# Patient Record
Sex: FEMALE | Race: WHITE | NOT HISPANIC OR LATINO | Employment: FULL TIME | URBAN - METROPOLITAN AREA
[De-identification: names, ages, dates, MRNs, and addresses within clinical notes are randomized per-mention and may not be internally consistent; named-entity substitution may affect disease eponyms.]

---

## 2017-05-13 LAB
CHOLEST SERPL-MSCNC: 195 MG/DL (ref 0–200)
HDLC SERPL-MCNC: 59 MG/DL
LDLC SERPL CALC-MCNC: 117 MG/DL
TRIGL SERPL-MCNC: 94 MG/DL

## 2021-04-20 ENCOUNTER — OFFICE VISIT (OUTPATIENT)
Dept: FAMILY MEDICINE | Facility: CLINIC | Age: 51
End: 2021-04-20

## 2021-04-20 VITALS
SYSTOLIC BLOOD PRESSURE: 163 MMHG | WEIGHT: 230 LBS | HEART RATE: 76 BPM | OXYGEN SATURATION: 98 % | DIASTOLIC BLOOD PRESSURE: 107 MMHG | TEMPERATURE: 98 F | BODY MASS INDEX: 36.1 KG/M2 | RESPIRATION RATE: 16 BRPM | HEIGHT: 67 IN

## 2021-04-20 DIAGNOSIS — I10 ESSENTIAL HYPERTENSION: ICD-10-CM

## 2021-04-20 DIAGNOSIS — Z76.89 ENCOUNTER TO ESTABLISH CARE: Primary | ICD-10-CM

## 2021-04-20 DIAGNOSIS — F41.1 GAD (GENERALIZED ANXIETY DISORDER): ICD-10-CM

## 2021-04-20 PROCEDURE — 99204 PR OFFICE/OUTPT VISIT, NEW, LEVL IV, 45-59 MIN: ICD-10-PCS | Mod: S$GLB,,, | Performed by: NURSE PRACTITIONER

## 2021-04-20 PROCEDURE — 99204 OFFICE O/P NEW MOD 45 MIN: CPT | Mod: S$GLB,,, | Performed by: NURSE PRACTITIONER

## 2021-04-20 RX ORDER — ALPRAZOLAM 0.5 MG/1
TABLET ORAL
COMMUNITY
End: 2021-08-04 | Stop reason: SDUPTHER

## 2021-04-20 RX ORDER — LISINOPRIL AND HYDROCHLOROTHIAZIDE 20; 25 MG/1; MG/1
1 TABLET ORAL DAILY
Qty: 30 TABLET | Refills: 2 | Status: SHIPPED | OUTPATIENT
Start: 2021-04-20 | End: 2021-05-05

## 2021-04-20 RX ORDER — GABAPENTIN 600 MG/1
800 TABLET ORAL 2 TIMES DAILY
COMMUNITY
End: 2021-05-05 | Stop reason: SDUPTHER

## 2021-04-20 RX ORDER — NEBIVOLOL 5 MG/1
5 TABLET ORAL DAILY
COMMUNITY
End: 2021-04-20

## 2021-04-22 ENCOUNTER — NURSE TRIAGE (OUTPATIENT)
Dept: ADMINISTRATIVE | Facility: CLINIC | Age: 51
End: 2021-04-22

## 2021-04-22 ENCOUNTER — TELEPHONE (OUTPATIENT)
Dept: FAMILY MEDICINE | Facility: CLINIC | Age: 51
End: 2021-04-22

## 2021-04-22 ENCOUNTER — PATIENT OUTREACH (OUTPATIENT)
Dept: ADMINISTRATIVE | Facility: HOSPITAL | Age: 51
End: 2021-04-22

## 2021-04-22 ENCOUNTER — PATIENT MESSAGE (OUTPATIENT)
Dept: ADMINISTRATIVE | Facility: HOSPITAL | Age: 51
End: 2021-04-22

## 2021-04-22 DIAGNOSIS — I10 ESSENTIAL HYPERTENSION: ICD-10-CM

## 2021-04-22 DIAGNOSIS — Z11.59 NEED FOR HEPATITIS C SCREENING TEST: ICD-10-CM

## 2021-04-26 ENCOUNTER — TELEPHONE (OUTPATIENT)
Dept: FAMILY MEDICINE | Facility: CLINIC | Age: 51
End: 2021-04-26

## 2021-04-28 ENCOUNTER — TELEPHONE (OUTPATIENT)
Dept: FAMILY MEDICINE | Facility: CLINIC | Age: 51
End: 2021-04-28

## 2021-04-28 ENCOUNTER — PATIENT MESSAGE (OUTPATIENT)
Dept: FAMILY MEDICINE | Facility: CLINIC | Age: 51
End: 2021-04-28

## 2021-04-30 ENCOUNTER — TELEPHONE (OUTPATIENT)
Dept: ADMINISTRATIVE | Facility: HOSPITAL | Age: 51
End: 2021-04-30

## 2021-05-05 ENCOUNTER — OFFICE VISIT (OUTPATIENT)
Dept: FAMILY MEDICINE | Facility: CLINIC | Age: 51
End: 2021-05-05

## 2021-05-05 ENCOUNTER — LAB VISIT (OUTPATIENT)
Dept: LAB | Facility: HOSPITAL | Age: 51
End: 2021-05-05
Attending: NURSE PRACTITIONER

## 2021-05-05 VITALS
OXYGEN SATURATION: 98 % | RESPIRATION RATE: 17 BRPM | TEMPERATURE: 98 F | HEIGHT: 67 IN | WEIGHT: 230 LBS | DIASTOLIC BLOOD PRESSURE: 102 MMHG | BODY MASS INDEX: 36.1 KG/M2 | SYSTOLIC BLOOD PRESSURE: 154 MMHG | HEART RATE: 86 BPM

## 2021-05-05 DIAGNOSIS — I10 ESSENTIAL HYPERTENSION: ICD-10-CM

## 2021-05-05 DIAGNOSIS — Z13.220 ENCOUNTER FOR LIPID SCREENING FOR CARDIOVASCULAR DISEASE: ICD-10-CM

## 2021-05-05 DIAGNOSIS — Z12.31 BREAST CANCER SCREENING BY MAMMOGRAM: ICD-10-CM

## 2021-05-05 DIAGNOSIS — Z79.899 HIGH RISK MEDICATION USE: ICD-10-CM

## 2021-05-05 DIAGNOSIS — Z13.6 ENCOUNTER FOR LIPID SCREENING FOR CARDIOVASCULAR DISEASE: ICD-10-CM

## 2021-05-05 DIAGNOSIS — Z11.4 ENCOUNTER FOR SCREENING FOR HUMAN IMMUNODEFICIENCY VIRUS (HIV): ICD-10-CM

## 2021-05-05 DIAGNOSIS — I10 ESSENTIAL HYPERTENSION: Primary | ICD-10-CM

## 2021-05-05 DIAGNOSIS — Z11.59 NEED FOR HEPATITIS C SCREENING TEST: ICD-10-CM

## 2021-05-05 LAB
ALBUMIN SERPL BCP-MCNC: 4 G/DL (ref 3.5–5.2)
ALBUMIN SERPL BCP-MCNC: 4 G/DL (ref 3.5–5.2)
ALP SERPL-CCNC: 69 U/L (ref 55–135)
ALP SERPL-CCNC: 69 U/L (ref 55–135)
ALT SERPL W/O P-5'-P-CCNC: 28 U/L (ref 10–44)
ALT SERPL W/O P-5'-P-CCNC: 28 U/L (ref 10–44)
ANION GAP SERPL CALC-SCNC: 10 MMOL/L (ref 8–16)
ANION GAP SERPL CALC-SCNC: 10 MMOL/L (ref 8–16)
AST SERPL-CCNC: 19 U/L (ref 10–40)
AST SERPL-CCNC: 19 U/L (ref 10–40)
BASOPHILS # BLD AUTO: 0.07 K/UL (ref 0–0.2)
BASOPHILS NFR BLD: 1.3 % (ref 0–1.9)
BILIRUB SERPL-MCNC: 0.7 MG/DL (ref 0.1–1)
BILIRUB SERPL-MCNC: 0.7 MG/DL (ref 0.1–1)
BUN SERPL-MCNC: 17 MG/DL (ref 6–20)
BUN SERPL-MCNC: 17 MG/DL (ref 6–20)
CALCIUM SERPL-MCNC: 8.9 MG/DL (ref 8.7–10.5)
CALCIUM SERPL-MCNC: 8.9 MG/DL (ref 8.7–10.5)
CHLORIDE SERPL-SCNC: 103 MMOL/L (ref 95–110)
CHLORIDE SERPL-SCNC: 103 MMOL/L (ref 95–110)
CHOLEST SERPL-MCNC: 188 MG/DL (ref 120–199)
CHOLEST/HDLC SERPL: 3.7 {RATIO} (ref 2–5)
CO2 SERPL-SCNC: 27 MMOL/L (ref 23–29)
CO2 SERPL-SCNC: 27 MMOL/L (ref 23–29)
CREAT SERPL-MCNC: 1.2 MG/DL (ref 0.5–1.4)
CREAT SERPL-MCNC: 1.2 MG/DL (ref 0.5–1.4)
DIFFERENTIAL METHOD: NORMAL
EOSINOPHIL # BLD AUTO: 0.2 K/UL (ref 0–0.5)
EOSINOPHIL NFR BLD: 3.4 % (ref 0–8)
ERYTHROCYTE [DISTWIDTH] IN BLOOD BY AUTOMATED COUNT: 13 % (ref 11.5–14.5)
EST. GFR  (AFRICAN AMERICAN): >60 ML/MIN/1.73 M^2
EST. GFR  (AFRICAN AMERICAN): >60 ML/MIN/1.73 M^2
EST. GFR  (NON AFRICAN AMERICAN): 52.1 ML/MIN/1.73 M^2
EST. GFR  (NON AFRICAN AMERICAN): 52.1 ML/MIN/1.73 M^2
GLUCOSE SERPL-MCNC: 112 MG/DL (ref 70–110)
GLUCOSE SERPL-MCNC: 112 MG/DL (ref 70–110)
HCT VFR BLD AUTO: 42.6 % (ref 37–48.5)
HDLC SERPL-MCNC: 51 MG/DL (ref 40–75)
HDLC SERPL: 27.1 % (ref 20–50)
HGB BLD-MCNC: 13.9 G/DL (ref 12–16)
IMM GRANULOCYTES # BLD AUTO: 0.01 K/UL (ref 0–0.04)
IMM GRANULOCYTES NFR BLD AUTO: 0.2 % (ref 0–0.5)
LDLC SERPL CALC-MCNC: 119.2 MG/DL (ref 63–159)
LYMPHOCYTES # BLD AUTO: 2.1 K/UL (ref 1–4.8)
LYMPHOCYTES NFR BLD: 39.2 % (ref 18–48)
MCH RBC QN AUTO: 30 PG (ref 27–31)
MCHC RBC AUTO-ENTMCNC: 32.6 G/DL (ref 32–36)
MCV RBC AUTO: 92 FL (ref 82–98)
MONOCYTES # BLD AUTO: 0.5 K/UL (ref 0.3–1)
MONOCYTES NFR BLD: 8.6 % (ref 4–15)
NEUTROPHILS # BLD AUTO: 2.5 K/UL (ref 1.8–7.7)
NEUTROPHILS NFR BLD: 47.3 % (ref 38–73)
NONHDLC SERPL-MCNC: 137 MG/DL
NRBC BLD-RTO: 0 /100 WBC
PLATELET # BLD AUTO: 222 K/UL (ref 150–450)
PMV BLD AUTO: 11.8 FL (ref 9.2–12.9)
POTASSIUM SERPL-SCNC: 4.1 MMOL/L (ref 3.5–5.1)
POTASSIUM SERPL-SCNC: 4.1 MMOL/L (ref 3.5–5.1)
PROT SERPL-MCNC: 7.3 G/DL (ref 6–8.4)
PROT SERPL-MCNC: 7.3 G/DL (ref 6–8.4)
RBC # BLD AUTO: 4.64 M/UL (ref 4–5.4)
SODIUM SERPL-SCNC: 140 MMOL/L (ref 136–145)
SODIUM SERPL-SCNC: 140 MMOL/L (ref 136–145)
T4 FREE SERPL-MCNC: 1.15 NG/DL (ref 0.71–1.51)
TRIGL SERPL-MCNC: 89 MG/DL (ref 30–150)
TSH SERPL DL<=0.005 MIU/L-ACNC: 2.39 UIU/ML (ref 0.34–5.6)
WBC # BLD AUTO: 5.26 K/UL (ref 3.9–12.7)

## 2021-05-05 PROCEDURE — 99214 PR OFFICE/OUTPT VISIT, EST, LEVL IV, 30-39 MIN: ICD-10-PCS | Mod: S$GLB,,, | Performed by: NURSE PRACTITIONER

## 2021-05-05 PROCEDURE — 80061 LIPID PANEL: CPT | Performed by: NURSE PRACTITIONER

## 2021-05-05 PROCEDURE — 86803 HEPATITIS C AB TEST: CPT | Performed by: NURSE PRACTITIONER

## 2021-05-05 PROCEDURE — 84443 ASSAY THYROID STIM HORMONE: CPT | Performed by: NURSE PRACTITIONER

## 2021-05-05 PROCEDURE — 85025 COMPLETE CBC W/AUTO DIFF WBC: CPT | Performed by: NURSE PRACTITIONER

## 2021-05-05 PROCEDURE — 99214 OFFICE O/P EST MOD 30 MIN: CPT | Mod: S$GLB,,, | Performed by: NURSE PRACTITIONER

## 2021-05-05 PROCEDURE — 86703 HIV-1/HIV-2 1 RESULT ANTBDY: CPT | Performed by: NURSE PRACTITIONER

## 2021-05-05 PROCEDURE — 84439 ASSAY OF FREE THYROXINE: CPT | Performed by: NURSE PRACTITIONER

## 2021-05-05 PROCEDURE — 80053 COMPREHEN METABOLIC PANEL: CPT | Performed by: NURSE PRACTITIONER

## 2021-05-05 PROCEDURE — 36415 COLL VENOUS BLD VENIPUNCTURE: CPT | Performed by: NURSE PRACTITIONER

## 2021-05-05 RX ORDER — LOSARTAN POTASSIUM 100 MG/1
100 TABLET ORAL DAILY
Qty: 30 TABLET | Refills: 5 | Status: SHIPPED | OUTPATIENT
Start: 2021-05-05 | End: 2021-11-22

## 2021-05-05 RX ORDER — GABAPENTIN 800 MG/1
800 TABLET ORAL 3 TIMES DAILY
COMMUNITY
Start: 2021-04-12 | End: 2021-08-15 | Stop reason: SDUPTHER

## 2021-05-05 RX ORDER — HYDROCHLOROTHIAZIDE 25 MG/1
25 TABLET ORAL DAILY
Qty: 30 TABLET | Refills: 5 | Status: SHIPPED | OUTPATIENT
Start: 2021-05-05 | End: 2021-11-05

## 2021-05-06 LAB
HCV AB SERPL QL IA: NEGATIVE
HIV 1+2 AB+HIV1 P24 AG SERPL QL IA: NEGATIVE

## 2021-05-26 ENCOUNTER — TELEPHONE (OUTPATIENT)
Dept: FAMILY MEDICINE | Facility: CLINIC | Age: 51
End: 2021-05-26

## 2021-06-03 ENCOUNTER — OFFICE VISIT (OUTPATIENT)
Dept: FAMILY MEDICINE | Facility: CLINIC | Age: 51
End: 2021-06-03

## 2021-06-03 VITALS
BODY MASS INDEX: 36.1 KG/M2 | WEIGHT: 230 LBS | SYSTOLIC BLOOD PRESSURE: 139 MMHG | HEART RATE: 62 BPM | TEMPERATURE: 97 F | OXYGEN SATURATION: 98 % | HEIGHT: 67 IN | RESPIRATION RATE: 16 BRPM | DIASTOLIC BLOOD PRESSURE: 87 MMHG

## 2021-06-03 DIAGNOSIS — I10 ESSENTIAL HYPERTENSION: ICD-10-CM

## 2021-06-03 DIAGNOSIS — E66.01 CLASS 2 SEVERE OBESITY DUE TO EXCESS CALORIES WITH SERIOUS COMORBIDITY AND BODY MASS INDEX (BMI) OF 36.0 TO 36.9 IN ADULT: Primary | ICD-10-CM

## 2021-06-03 DIAGNOSIS — R21 RASH AND NONSPECIFIC SKIN ERUPTION: ICD-10-CM

## 2021-06-03 PROCEDURE — 99214 PR OFFICE/OUTPT VISIT, EST, LEVL IV, 30-39 MIN: ICD-10-PCS | Mod: S$GLB,,, | Performed by: NURSE PRACTITIONER

## 2021-06-03 PROCEDURE — 99214 OFFICE O/P EST MOD 30 MIN: CPT | Mod: S$GLB,,, | Performed by: NURSE PRACTITIONER

## 2021-06-03 RX ORDER — BETAMETHASONE DIPROPIONATE 0.5 MG/G
CREAM TOPICAL 2 TIMES DAILY
Qty: 15 G | Refills: 1 | Status: SHIPPED | OUTPATIENT
Start: 2021-06-03 | End: 2021-08-04

## 2021-06-03 RX ORDER — AMLODIPINE BESYLATE 5 MG/1
5 TABLET ORAL DAILY
Qty: 30 TABLET | Refills: 6 | Status: SHIPPED | OUTPATIENT
Start: 2021-06-03 | End: 2021-08-04

## 2021-06-03 RX ORDER — PHENTERMINE HYDROCHLORIDE 37.5 MG/1
37.5 CAPSULE ORAL EVERY MORNING
Qty: 30 CAPSULE | Refills: 0 | Status: SHIPPED | OUTPATIENT
Start: 2021-06-03 | End: 2021-07-03

## 2021-07-21 ENCOUNTER — PATIENT OUTREACH (OUTPATIENT)
Dept: ADMINISTRATIVE | Facility: HOSPITAL | Age: 51
End: 2021-07-21

## 2021-07-21 ENCOUNTER — PATIENT MESSAGE (OUTPATIENT)
Dept: ADMINISTRATIVE | Facility: HOSPITAL | Age: 51
End: 2021-07-21

## 2021-08-04 ENCOUNTER — OFFICE VISIT (OUTPATIENT)
Dept: FAMILY MEDICINE | Facility: CLINIC | Age: 51
End: 2021-08-04

## 2021-08-04 VITALS
SYSTOLIC BLOOD PRESSURE: 122 MMHG | WEIGHT: 221 LBS | RESPIRATION RATE: 17 BRPM | OXYGEN SATURATION: 97 % | HEIGHT: 67 IN | HEART RATE: 62 BPM | DIASTOLIC BLOOD PRESSURE: 88 MMHG | BODY MASS INDEX: 34.69 KG/M2 | TEMPERATURE: 98 F

## 2021-08-04 DIAGNOSIS — E66.09 CLASS 1 OBESITY DUE TO EXCESS CALORIES WITH SERIOUS COMORBIDITY AND BODY MASS INDEX (BMI) OF 34.0 TO 34.9 IN ADULT: ICD-10-CM

## 2021-08-04 DIAGNOSIS — F41.1 GAD (GENERALIZED ANXIETY DISORDER): ICD-10-CM

## 2021-08-04 DIAGNOSIS — I10 ESSENTIAL HYPERTENSION: Primary | ICD-10-CM

## 2021-08-04 PROCEDURE — 99213 PR OFFICE/OUTPT VISIT, EST, LEVL III, 20-29 MIN: ICD-10-PCS | Mod: S$GLB,,, | Performed by: NURSE PRACTITIONER

## 2021-08-04 PROCEDURE — 99213 OFFICE O/P EST LOW 20 MIN: CPT | Mod: S$GLB,,, | Performed by: NURSE PRACTITIONER

## 2021-08-04 RX ORDER — ALPRAZOLAM 0.5 MG/1
0.5 TABLET ORAL 2 TIMES DAILY PRN
Qty: 30 TABLET | Refills: 1 | Status: SHIPPED | OUTPATIENT
Start: 2021-08-04 | End: 2022-09-30 | Stop reason: SDUPTHER

## 2021-08-04 RX ORDER — PHENTERMINE HYDROCHLORIDE 37.5 MG/1
37.5 TABLET ORAL
Qty: 30 TABLET | Refills: 0 | Status: SHIPPED | OUTPATIENT
Start: 2021-08-04 | End: 2021-09-03

## 2021-08-13 ENCOUNTER — PATIENT MESSAGE (OUTPATIENT)
Dept: FAMILY MEDICINE | Facility: CLINIC | Age: 51
End: 2021-08-13

## 2021-08-13 DIAGNOSIS — G89.4 CHRONIC PAIN SYNDROME: Primary | ICD-10-CM

## 2021-08-15 RX ORDER — GABAPENTIN 800 MG/1
800 TABLET ORAL 3 TIMES DAILY
Qty: 270 TABLET | Refills: 1 | Status: SHIPPED | OUTPATIENT
Start: 2021-08-15 | End: 2022-09-30 | Stop reason: SDUPTHER

## 2022-01-02 ENCOUNTER — PATIENT MESSAGE (OUTPATIENT)
Dept: ADMINISTRATIVE | Facility: HOSPITAL | Age: 52
End: 2022-01-02

## 2022-01-05 DIAGNOSIS — Z12.11 COLON CANCER SCREENING: ICD-10-CM

## 2022-01-10 ENCOUNTER — PATIENT MESSAGE (OUTPATIENT)
Dept: ADMINISTRATIVE | Facility: HOSPITAL | Age: 52
End: 2022-01-10

## 2022-04-04 ENCOUNTER — PATIENT MESSAGE (OUTPATIENT)
Dept: ADMINISTRATIVE | Facility: HOSPITAL | Age: 52
End: 2022-04-04

## 2022-05-11 DIAGNOSIS — I10 ESSENTIAL HYPERTENSION: ICD-10-CM

## 2022-05-19 ENCOUNTER — PATIENT MESSAGE (OUTPATIENT)
Dept: FAMILY MEDICINE | Facility: CLINIC | Age: 52
End: 2022-05-19

## 2022-05-19 DIAGNOSIS — R73.01 ELEVATED FASTING GLUCOSE: Primary | ICD-10-CM

## 2022-05-19 DIAGNOSIS — I10 ESSENTIAL HYPERTENSION: ICD-10-CM

## 2022-05-21 RX ORDER — LOSARTAN POTASSIUM 100 MG/1
100 TABLET ORAL DAILY
Qty: 30 TABLET | Refills: 1 | Status: SHIPPED | OUTPATIENT
Start: 2022-05-21 | End: 2022-07-31

## 2022-05-21 RX ORDER — HYDROCHLOROTHIAZIDE 25 MG/1
25 TABLET ORAL DAILY
Qty: 30 TABLET | Refills: 1 | Status: SHIPPED | OUTPATIENT
Start: 2022-05-21 | End: 2022-07-31

## 2022-05-29 ENCOUNTER — TELEPHONE (OUTPATIENT)
Dept: FAMILY MEDICINE | Facility: CLINIC | Age: 52
End: 2022-05-29

## 2022-05-31 ENCOUNTER — PATIENT MESSAGE (OUTPATIENT)
Dept: ADMINISTRATIVE | Facility: HOSPITAL | Age: 52
End: 2022-05-31

## 2022-07-06 DIAGNOSIS — Z12.31 OTHER SCREENING MAMMOGRAM: ICD-10-CM

## 2022-07-11 ENCOUNTER — PATIENT MESSAGE (OUTPATIENT)
Dept: ADMINISTRATIVE | Facility: HOSPITAL | Age: 52
End: 2022-07-11

## 2022-08-24 ENCOUNTER — PATIENT MESSAGE (OUTPATIENT)
Dept: ADMINISTRATIVE | Facility: HOSPITAL | Age: 52
End: 2022-08-24

## 2022-09-15 ENCOUNTER — PATIENT MESSAGE (OUTPATIENT)
Dept: ADMINISTRATIVE | Facility: HOSPITAL | Age: 52
End: 2022-09-15

## 2022-09-28 DIAGNOSIS — I10 ESSENTIAL HYPERTENSION: ICD-10-CM

## 2022-09-28 DIAGNOSIS — G89.4 CHRONIC PAIN SYNDROME: ICD-10-CM

## 2022-09-29 NOTE — TELEPHONE ENCOUNTER
----- Message from Jennifer Quigley sent at 9/29/2022  4:11 PM CDT -----  Regarding: refill  Can the clinic reply in [facility]:       Please refill the medication(s) listed below. Please call the patient when the prescription(s) is ready for  at this phone number NEWTON GRIFFITH [[phone]] [phone] (home)           Medication #1 losartan (COZAAR) 100 MG tablet    Medication #2 hydroCHLOROthiazide (HYDRODIURIL) 25 MG tablet    Medication #3 gabapentin (NEURONTIN) 800 MG tablet      Preferred Pharmacy:   [id number] [place] [id number] AT [facility] OF [address] [facility]  [address] [address]  Phone: [phone] Fax: [phone]

## 2022-09-30 ENCOUNTER — OFFICE VISIT (OUTPATIENT)
Dept: FAMILY MEDICINE | Facility: CLINIC | Age: 52
End: 2022-09-30

## 2022-09-30 VITALS
BODY MASS INDEX: 35.37 KG/M2 | HEART RATE: 69 BPM | RESPIRATION RATE: 16 BRPM | OXYGEN SATURATION: 97 % | WEIGHT: 225.38 LBS | HEIGHT: 67 IN | DIASTOLIC BLOOD PRESSURE: 78 MMHG | SYSTOLIC BLOOD PRESSURE: 132 MMHG | TEMPERATURE: 98 F

## 2022-09-30 DIAGNOSIS — G89.4 CHRONIC PAIN SYNDROME: ICD-10-CM

## 2022-09-30 DIAGNOSIS — F41.1 GAD (GENERALIZED ANXIETY DISORDER): ICD-10-CM

## 2022-09-30 DIAGNOSIS — E66.01 CLASS 2 SEVERE OBESITY DUE TO EXCESS CALORIES WITH SERIOUS COMORBIDITY AND BODY MASS INDEX (BMI) OF 35.0 TO 35.9 IN ADULT: ICD-10-CM

## 2022-09-30 DIAGNOSIS — I10 ESSENTIAL HYPERTENSION: Primary | ICD-10-CM

## 2022-09-30 PROCEDURE — 99999 PR PBB SHADOW E&M-EST. PATIENT-LVL IV: CPT | Mod: PBBFAC,,, | Performed by: NURSE PRACTITIONER

## 2022-09-30 PROCEDURE — 99214 OFFICE O/P EST MOD 30 MIN: CPT | Mod: S$PBB,,, | Performed by: NURSE PRACTITIONER

## 2022-09-30 PROCEDURE — 99214 OFFICE O/P EST MOD 30 MIN: CPT | Mod: PBBFAC | Performed by: NURSE PRACTITIONER

## 2022-09-30 PROCEDURE — 99214 PR OFFICE/OUTPT VISIT, EST, LEVL IV, 30-39 MIN: ICD-10-PCS | Mod: S$PBB,,, | Performed by: NURSE PRACTITIONER

## 2022-09-30 PROCEDURE — 99999 PR PBB SHADOW E&M-EST. PATIENT-LVL IV: ICD-10-PCS | Mod: PBBFAC,,, | Performed by: NURSE PRACTITIONER

## 2022-09-30 RX ORDER — PAROXETINE 10 MG/1
10 TABLET, FILM COATED ORAL EVERY MORNING
Qty: 90 TABLET | Refills: 3 | Status: SHIPPED | OUTPATIENT
Start: 2022-09-30 | End: 2023-01-03

## 2022-09-30 RX ORDER — HYDROCHLOROTHIAZIDE 25 MG/1
25 TABLET ORAL DAILY
Qty: 90 TABLET | Refills: 1 | Status: SHIPPED | OUTPATIENT
Start: 2022-09-30 | End: 2023-04-24 | Stop reason: SDUPTHER

## 2022-09-30 RX ORDER — ALPRAZOLAM 0.5 MG/1
0.5 TABLET ORAL DAILY PRN
Qty: 30 TABLET | Refills: 2 | Status: SHIPPED | OUTPATIENT
Start: 2022-09-30 | End: 2023-01-03

## 2022-09-30 RX ORDER — LOSARTAN POTASSIUM 100 MG/1
100 TABLET ORAL DAILY
Qty: 90 TABLET | Refills: 1 | Status: SHIPPED | OUTPATIENT
Start: 2022-09-30 | End: 2023-04-24 | Stop reason: SDUPTHER

## 2022-09-30 RX ORDER — AMLODIPINE BESYLATE 5 MG/1
5 TABLET ORAL DAILY
Qty: 90 TABLET | Refills: 1 | Status: SHIPPED | OUTPATIENT
Start: 2022-09-30 | End: 2023-01-09

## 2022-09-30 RX ORDER — GABAPENTIN 800 MG/1
800 TABLET ORAL 3 TIMES DAILY
Qty: 270 TABLET | Refills: 3 | Status: SHIPPED | OUTPATIENT
Start: 2022-09-30 | End: 2023-08-28

## 2022-09-30 RX ORDER — AMLODIPINE BESYLATE 5 MG/1
5 TABLET ORAL DAILY
COMMUNITY
End: 2022-09-30 | Stop reason: SDUPTHER

## 2022-09-30 NOTE — PATIENT INSTRUCTIONS
[facility] [phone]  --------------  Needs colon cancer screening (FOBT)  Labs---CBC, CMP  Mammogram  Pap smear (53993)  --------------------  Information given to call about cost

## 2022-09-30 NOTE — PROGRESS NOTES
Subjective:       Patient ID: Davina Stinson is a 52 y.o. female.    Chief Complaint: Medication Refill (Pt is here for a med refill pt has no insurance pt is in need of HM test)    The patient is a 51 y/o female that presents for HTN follow up and refills. Home BP before meds [id number] thus edu to check BP after meds taken. Takes HCTZ before bed with nocturia x3 yet feels bad when she takes in the am d/t working outside all day thus told to take at 4:00pm to prevent nocturia. Gabapentin is helpful for her scoliosis and hips. Due for mammogram yet self pay yet discuss financial asst.  H/o ANKIT reports anxiety so bad sometimes that she has to pull over while driving and take deep breaths, also exacerbated at night d/t mind over thinking. Xanax was very helpful and requesting. Last refill 2021 per . Moderate anxiety per ANKIT-7. Agreeable to starting paxil too. Obesity with 20 lbs lost thus recommended 1200 pavan per day.  -  Past Medical History:   Diagnosis Date    Anxiety     Depression     DVT (deep venous thrombosis) 2016    Right - unknown cause    Hypertension        Past Surgical History:   Procedure Laterality Date    PARTIAL HYSTERECTOMY  2007    REDUCTION OF BOTH BREASTS  2006        Social History     Socioeconomic History    Marital status:     Number of children: 1    Highest education level: Some college, no degree   Occupational History    Occupation: Supervisor at [employer]   Tobacco Use    Smoking status: Never    Smokeless tobacco: Never   Substance and Sexual Activity    Alcohol use: Not Currently    Drug use: Not Currently    Sexual activity: Yes     Partners: Male       Family History   Problem Relation Age of Onset    Hypertension Mother     Heart disease Mother         afib    Stroke Mother     Lung cancer Mother     Diabetes Brother     Hypertension Brother     Diabetes Maternal Aunt     Diabetes Maternal Uncle     Heart disease Father          from a staff infection in the  [facility]    Hypertension Brother     Hypertension Brother     Diabetes Brother        Review of patient's allergies indicates:   Allergen Reactions    Sulfa (sulfonamide antibiotics)           Current Outpatient Medications:     ALPRAZolam (XANAX) 0.5 MG tablet, Take 1 tablet (0.5 mg total) by mouth daily as needed for Anxiety., Disp: 30 tablet, Rfl: 2    amLODIPine (NORVASC) 5 MG tablet, Take 1 tablet (5 mg total) by mouth once daily., Disp: 90 tablet, Rfl: 1    gabapentin (NEURONTIN) 800 MG tablet, Take 1 tablet (800 mg total) by mouth 3 (three) times daily., Disp: 270 tablet, Rfl: 3    hydroCHLOROthiazide (HYDRODIURIL) 25 MG tablet, Take 1 tablet (25 mg total) by mouth once daily., Disp: 90 tablet, Rfl: 1    losartan (COZAAR) 100 MG tablet, Take 1 tablet (100 mg total) by mouth once daily., Disp: 90 tablet, Rfl: 1    paroxetine (PAXIL) 10 MG tablet, Take 1 tablet (10 mg total) by mouth every morning., Disp: 90 tablet, Rfl: 3    Medication Refill    Review of Systems   Constitutional: Negative.    HENT: Negative.     Eyes: Negative.    Respiratory: Negative.     Cardiovascular: Negative.    Gastrointestinal: Negative.    Endocrine: Negative.    Genitourinary: Negative.    Musculoskeletal: Negative.    Skin: Negative.    Allergic/Immunologic: Negative.    Neurological: Negative.    Hematological: Negative.    Psychiatric/Behavioral: Negative.       Objective:      Physical Exam  Vitals reviewed.   Constitutional:       General: She is not in acute distress.     Appearance: Normal appearance. She is well-developed. She is not ill-appearing.   HENT:      Head: Normocephalic.   Eyes:      Conjunctiva/sclera: Conjunctivae normal.   Neck:      Thyroid: No thyromegaly.   Cardiovascular:      Rate and Rhythm: Normal rate and regular rhythm.      Heart sounds: Normal heart sounds. No murmur heard.  Pulmonary:      Effort: Pulmonary effort is normal.      Breath sounds: Normal breath sounds. No wheezing or rales.    Musculoskeletal:         General: Normal range of motion.      Cervical back: Normal range of motion.      Right lower leg: No edema.      Left lower leg: No edema.   Skin:     General: Skin is warm and dry.   Neurological:      Mental Status: She is alert and oriented to person, place, and time. Mental status is at baseline.   Psychiatric:         Mood and Affect: Mood normal.         Behavior: Behavior normal.         Thought Content: Thought content normal.         Judgment: Judgment normal.       Assessment:       1. Essential hypertension    2. ANKIT (generalized anxiety disorder)    3. Chronic pain syndrome    4. Class 2 severe obesity due to excess calories with serious comorbidity and body mass index (BMI) of 35.0 to 35.9 in adult          Plan:     1- [facility] [phone]  2- all med filled  3- start paxil and resume xanax  4- RTC 6 weeks for pap and check on paxil effects   ------------------------------  Needs colon cancer screening (FOBT)  Labs---CBC, CMP  Mammogram  Pap smear ([id number])  --------------------  Information given to call about cost    Essential hypertension  -     hydroCHLOROthiazide (HYDRODIURIL) 25 MG tablet; Take 1 tablet (25 mg total) by mouth once daily.  Dispense: 90 tablet; Refill: 1  -     losartan (COZAAR) 100 MG tablet; Take 1 tablet (100 mg total) by mouth once daily.  Dispense: 90 tablet; Refill: 1  -     amLODIPine (NORVASC) 5 MG tablet; Take 1 tablet (5 mg total) by mouth once daily.  Dispense: 90 tablet; Refill: 1    ANKIT (generalized anxiety disorder)  -     ALPRAZolam (XANAX) 0.5 MG tablet; Take 1 tablet (0.5 mg total) by mouth daily as needed for Anxiety.  Dispense: 30 tablet; Refill: 2  -     paroxetine (PAXIL) 10 MG tablet; Take 1 tablet (10 mg total) by mouth every morning.  Dispense: 90 tablet; Refill: 3    Chronic pain syndrome  -     gabapentin (NEURONTIN) 800 MG tablet; Take 1 tablet (800 mg total) by mouth 3 (three) times daily.  Dispense:  270 tablet; Refill: 3    Class 2 severe obesity due to excess calories with serious comorbidity and body mass index (BMI) of 35.0 to 35.9 in adult      Risks, benefits, and side effects were discussed with the patient. All questions were answered to the fullest satisfaction of the patient, and pt verbalized understanding and agreement to treatment plan. Pt was to call with any new or worsening symptoms, or present to the ER.

## 2022-10-01 RX ORDER — LOSARTAN POTASSIUM 100 MG/1
TABLET ORAL
Qty: 30 TABLET | Refills: 1 | OUTPATIENT
Start: 2022-10-01

## 2022-10-01 RX ORDER — GABAPENTIN 800 MG/1
800 TABLET ORAL 3 TIMES DAILY
Qty: 270 TABLET | Refills: 1 | OUTPATIENT
Start: 2022-10-01

## 2022-10-01 RX ORDER — HYDROCHLOROTHIAZIDE 25 MG/1
TABLET ORAL
Qty: 30 TABLET | Refills: 1 | OUTPATIENT
Start: 2022-10-01

## 2022-10-11 ENCOUNTER — PATIENT MESSAGE (OUTPATIENT)
Dept: ADMINISTRATIVE | Facility: HOSPITAL | Age: 52
End: 2022-10-11

## 2022-10-18 ENCOUNTER — PATIENT MESSAGE (OUTPATIENT)
Dept: ADMINISTRATIVE | Facility: HOSPITAL | Age: 52
End: 2022-10-18

## 2023-01-03 ENCOUNTER — OFFICE VISIT (OUTPATIENT)
Dept: FAMILY MEDICINE | Facility: CLINIC | Age: 53
End: 2023-01-03
Payer: COMMERCIAL

## 2023-01-03 VITALS
BODY MASS INDEX: 36.62 KG/M2 | DIASTOLIC BLOOD PRESSURE: 84 MMHG | HEART RATE: 60 BPM | OXYGEN SATURATION: 97 % | WEIGHT: 233.81 LBS | SYSTOLIC BLOOD PRESSURE: 142 MMHG

## 2023-01-03 DIAGNOSIS — F41.1 GAD (GENERALIZED ANXIETY DISORDER): Primary | ICD-10-CM

## 2023-01-03 DIAGNOSIS — F51.01 PRIMARY INSOMNIA: ICD-10-CM

## 2023-01-03 DIAGNOSIS — I10 ESSENTIAL HYPERTENSION: ICD-10-CM

## 2023-01-03 DIAGNOSIS — Z12.31 BREAST CANCER SCREENING BY MAMMOGRAM: ICD-10-CM

## 2023-01-03 DIAGNOSIS — Z12.11 COLON CANCER SCREENING: ICD-10-CM

## 2023-01-03 DIAGNOSIS — Z13.31 POSITIVE DEPRESSION SCREENING: ICD-10-CM

## 2023-01-03 PROCEDURE — 99214 OFFICE O/P EST MOD 30 MIN: CPT | Mod: S$GLB,,, | Performed by: NURSE PRACTITIONER

## 2023-01-03 PROCEDURE — 99214 PR OFFICE/OUTPT VISIT, EST, LEVL IV, 30-39 MIN: ICD-10-PCS | Mod: S$GLB,,, | Performed by: NURSE PRACTITIONER

## 2023-01-03 PROCEDURE — 99999 PR PBB SHADOW E&M-EST. PATIENT-LVL IV: ICD-10-PCS | Mod: PBBFAC,,, | Performed by: NURSE PRACTITIONER

## 2023-01-03 PROCEDURE — 99999 PR PBB SHADOW E&M-EST. PATIENT-LVL IV: CPT | Mod: PBBFAC,,, | Performed by: NURSE PRACTITIONER

## 2023-01-03 RX ORDER — CLONAZEPAM 0.5 MG/1
0.5 TABLET ORAL 2 TIMES DAILY PRN
Qty: 30 TABLET | Refills: 2 | Status: SHIPPED | OUTPATIENT
Start: 2023-01-03 | End: 2023-01-03

## 2023-01-03 RX ORDER — PAROXETINE HYDROCHLORIDE 20 MG/1
20 TABLET, FILM COATED ORAL EVERY MORNING
Qty: 90 TABLET | Refills: 3 | Status: SHIPPED | OUTPATIENT
Start: 2023-01-03 | End: 2023-05-15 | Stop reason: SDUPTHER

## 2023-01-03 RX ORDER — CLONAZEPAM 0.5 MG/1
0.5 TABLET ORAL 2 TIMES DAILY PRN
Qty: 60 TABLET | Refills: 2 | Status: SHIPPED | OUTPATIENT
Start: 2023-01-03 | End: 2023-05-15 | Stop reason: SDUPTHER

## 2023-01-03 NOTE — PROGRESS NOTES
I have used clinical judgement based on duration and functional status to consider definite necessity for treatment. yes  Subjective:       Patient ID: Davina Stinson is a 52 y.o. female.    Chief Complaint: Follow-up (Fu since starting Paxil)  -  The patient is a 51 y/o female that presents for medication follow up. At last visit she was started on Paxil and was told to resume xanax for ANKIT. Reports improvement noted and is not yelling and angry like she was but could use the 20 mg. Reports xanax makes her feel lethargic but still feels hung over the next day, even if she takes 1/2 tab.     Depression screen (+) yet did not feel like she was depressed but after talking she endorse behaviors of distancing herself, isolating and not wanting to be around people.     Has gained 8 lb since last visit reports due to the holiday and additional eating. Am /92 and pm /84. Did not start norvasc as she was confused on the whether to start or not.     Due for health maintenance and agreeable  -     Past Medical History:   Diagnosis Date    Anxiety     Depression     DVT (deep venous thrombosis) 2016    Right - unknown cause    Hypertension        Past Surgical History:   Procedure Laterality Date    PARTIAL HYSTERECTOMY  2007    REDUCTION OF BOTH BREASTS  2006        Social History     Socioeconomic History    Marital status:     Number of children: 1    Highest education level: Some college, no degree   Occupational History    Occupation: Supervisor at [employer]   Tobacco Use    Smoking status: Never    Smokeless tobacco: Never   Substance and Sexual Activity    Alcohol use: Not Currently    Drug use: Not Currently    Sexual activity: Yes     Partners: Male       Family History   Problem Relation Age of Onset    Hypertension Mother     Heart disease Mother         afib    Stroke Mother     Lung cancer Mother     Diabetes Brother     Hypertension Brother     Diabetes Maternal Aunt     Diabetes Maternal  Uncle     Heart disease Father          from a staff infection in the hospital    Hypertension Brother     Hypertension Brother     Diabetes Brother        Review of patient's allergies indicates:   Allergen Reactions    Sulfa (sulfonamide antibiotics)           Current Outpatient Medications:     gabapentin (NEURONTIN) 800 MG tablet, Take 1 tablet (800 mg total) by mouth 3 (three) times daily., Disp: 270 tablet, Rfl: 3    hydroCHLOROthiazide (HYDRODIURIL) 25 MG tablet, Take 1 tablet (25 mg total) by mouth once daily., Disp: 90 tablet, Rfl: 1    losartan (COZAAR) 100 MG tablet, Take 1 tablet (100 mg total) by mouth once daily., Disp: 90 tablet, Rfl: 1    amLODIPine (NORVASC) 5 MG tablet, Take 1 tablet (5 mg total) by mouth once daily. (Patient not taking: Reported on 1/3/2023), Disp: 90 tablet, Rfl: 1    clonazePAM (KLONOPIN) 0.5 MG tablet, Take 1 tablet (0.5 mg total) by mouth 2 (two) times daily as needed for Anxiety., Disp: 60 tablet, Rfl: 2    paroxetine (PAXIL) 20 MG tablet, Take 1 tablet (20 mg total) by mouth every morning., Disp: 90 tablet, Rfl: 3    Follow-up    Review of Systems   Constitutional: Negative.    HENT: Negative.     Eyes: Negative.    Respiratory: Negative.     Cardiovascular: Negative.    Gastrointestinal: Negative.    Endocrine: Negative.    Genitourinary: Negative.    Musculoskeletal: Negative.    Skin: Negative.    Allergic/Immunologic: Negative.    Neurological: Negative.    Hematological: Negative.    Psychiatric/Behavioral:  Positive for dysphoric mood and sleep disturbance. The patient is nervous/anxious.      Objective:      Physical Exam  Vitals and nursing note reviewed.   Constitutional:       General: She is not in acute distress.     Appearance: Normal appearance. She is well-developed. She is obese. She is not ill-appearing.   HENT:      Head: Normocephalic.   Eyes:      Conjunctiva/sclera: Conjunctivae normal.   Neck:      Thyroid: No thyromegaly.   Cardiovascular:       Rate and Rhythm: Normal rate and regular rhythm.      Heart sounds: Normal heart sounds. No murmur heard.  Pulmonary:      Effort: Pulmonary effort is normal.      Breath sounds: Normal breath sounds. No wheezing or rales.   Musculoskeletal:         General: Normal range of motion.      Cervical back: Normal range of motion.      Right lower leg: No edema.      Left lower leg: No edema.   Skin:     General: Skin is warm and dry.   Neurological:      Mental Status: She is alert and oriented to person, place, and time. Mental status is at baseline.   Psychiatric:         Mood and Affect: Mood normal.         Behavior: Behavior normal.         Thought Content: Thought content normal.         Judgment: Judgment normal.       Assessment:       1. ANKIT (generalized anxiety disorder)    2. Positive depression screening    3. Essential hypertension    4. Primary insomnia    5. Breast cancer screening by mammogram    6. Colon cancer screening          Plan:     1- start norvasc at night  2- log home BP and RTC in 2 weeks for nurse visit  3- inc paxil to 20 mg for ANKIT and depression  4- continue cozaar in AM and take HCTZ in AM  5- d/c xanax and start klonopin   6- RTC 6 weeks for pap  7- [facility] to [facility]  8- labs on day of pap and mammo   9- weight loss encouraged, at least the 8 lb gained  -  ANKIT (generalized anxiety disorder)  -     paroxetine (PAXIL) 20 MG tablet; Take 1 tablet (20 mg total) by mouth every morning.  Dispense: 90 tablet; Refill: 3  -     Discontinue: clonazePAM (KLONOPIN) 0.5 MG tablet; Take 1 tablet (0.5 mg total) by mouth 2 (two) times daily as needed for Anxiety.  Dispense: 30 tablet; Refill: 2  -     clonazePAM (KLONOPIN) 0.5 MG tablet; Take 1 tablet (0.5 mg total) by mouth 2 (two) times daily as needed for Anxiety.  Dispense: 60 tablet; Refill: 2    Positive depression screening  Comments:  I have reviewed the positive depression score which warrants active treatment with  psychotherapy and/or medications.  Orders:  -     paroxetine (PAXIL) 20 MG tablet; Take 1 tablet (20 mg total) by mouth every morning.  Dispense: 90 tablet; Refill: 3    Essential hypertension    Primary insomnia  -     clonazePAM (KLONOPIN) 0.5 MG tablet; Take 1 tablet (0.5 mg total) by mouth 2 (two) times daily as needed for Anxiety.  Dispense: 60 tablet; Refill: 2    Breast cancer screening by mammogram  -     Mammo Digital Screening Bilat w/ Efren; Future; Expected date: 02/03/2023    Colon cancer screening  -     Ambulatory referral/consult to General Surgery; Future; Expected date: 01/10/2023        Risks, benefits, and side effects were discussed with the patient. All questions were answered to the fullest satisfaction of the patient, and pt verbalized understanding and agreement to treatment plan. Pt was to call with any new or worsening symptoms, or present to the ER.

## 2023-01-04 ENCOUNTER — PATIENT MESSAGE (OUTPATIENT)
Dept: ADMINISTRATIVE | Facility: HOSPITAL | Age: 53
End: 2023-01-04
Payer: COMMERCIAL

## 2023-04-11 ENCOUNTER — PATIENT MESSAGE (OUTPATIENT)
Dept: ADMINISTRATIVE | Facility: HOSPITAL | Age: 53
End: 2023-04-11
Payer: COMMERCIAL

## 2023-05-03 ENCOUNTER — OFFICE VISIT (OUTPATIENT)
Dept: SURGERY | Facility: CLINIC | Age: 53
End: 2023-05-03

## 2023-05-03 VITALS
SYSTOLIC BLOOD PRESSURE: 122 MMHG | WEIGHT: 224 LBS | DIASTOLIC BLOOD PRESSURE: 83 MMHG | HEART RATE: 77 BPM | OXYGEN SATURATION: 95 % | BODY MASS INDEX: 35.16 KG/M2 | HEIGHT: 67 IN

## 2023-05-03 DIAGNOSIS — K92.1 HEMATOCHEZIA: Primary | ICD-10-CM

## 2023-05-03 PROCEDURE — 99999 PR PBB SHADOW E&M-EST. PATIENT-LVL V: ICD-10-PCS | Mod: PBBFAC,,, | Performed by: STUDENT IN AN ORGANIZED HEALTH CARE EDUCATION/TRAINING PROGRAM

## 2023-05-03 PROCEDURE — 99999 PR PBB SHADOW E&M-EST. PATIENT-LVL V: CPT | Mod: PBBFAC,,, | Performed by: STUDENT IN AN ORGANIZED HEALTH CARE EDUCATION/TRAINING PROGRAM

## 2023-05-03 PROCEDURE — 99203 PR OFFICE/OUTPT VISIT, NEW, LEVL III, 30-44 MIN: ICD-10-PCS | Mod: S$PBB,,, | Performed by: STUDENT IN AN ORGANIZED HEALTH CARE EDUCATION/TRAINING PROGRAM

## 2023-05-03 PROCEDURE — 99203 OFFICE O/P NEW LOW 30 MIN: CPT | Mod: S$PBB,,, | Performed by: STUDENT IN AN ORGANIZED HEALTH CARE EDUCATION/TRAINING PROGRAM

## 2023-05-03 PROCEDURE — 99215 OFFICE O/P EST HI 40 MIN: CPT | Mod: PBBFAC | Performed by: STUDENT IN AN ORGANIZED HEALTH CARE EDUCATION/TRAINING PROGRAM

## 2023-05-03 RX ORDER — SODIUM CHLORIDE 9 MG/ML
INJECTION, SOLUTION INTRAVENOUS CONTINUOUS
Status: CANCELLED | OUTPATIENT
Start: 2023-05-03

## 2023-05-03 NOTE — PATIENT INSTRUCTIONS
Miralax Bowel Prep Instructions     Date of procedure:  May 23, 2023    Bowel Prep Instructions:  You will need to purchase at the store the following from the pharmacy:  One (1) box laxative tablets, (NOT STOOL SOFTNERS)  8.3 oz bottle of Miralax (or generic)   Two (2) quarts (64oz) of liquid to drink. We suggest Gatorade or Powerade.  Do not follow packaging instructions on either of these items      Do not take the following Medications for 3 days prior to your procedure:   Aspirin, Excedrin, BC powder or goodies powders   Ibuprofen or Motrin  Naproxen or Aleve  Fish oils   Vitamin E    Day 1 May 22, 2023   All day you will be on a Clear Liquid Diet   You may have the following chicken, beef or bone broth, Jell-O, Popsicles, Sprite, Water, Gatorade, Powerade, and Black Coffee.   Do not eat or drink anything RED OR PURPLE IN COLOR     At Noon 12:00 pm, you will take two laxative tablets.      At 2:00 pm, you will drink half of your Miralax prep Drink     At 6:00 pm, you will drink the remaining half of your prep drink and two laxative tablets.      Do not eat or drink after Midnight       Day 2 May 23, 2023   You may take your blood pressure medications with a small sip of water before the procedure. Take all other morning medications after the procedure.        Location of Department:   [facility] [address], [place] [address]    Parking:    Use parking lot 1  Enter at the main hospital entrance  Check in with outpatient registration    Contact:   Someone from surgery will call you with a time of arrival.   If you do not receive a call from surgery by 2pm the business day (May 22, 2023) before your procedure, please call [phone].     Transportation:   You will NOT be able to drive yourself.  You are required to have someone drive you home from the hospital due to the anesthesia.

## 2023-05-03 NOTE — H&P
[facility] Surgery H&P Note    Subjective:       Patient ID: Davina Stinson is a 53 y.o. female.    Chief Complaint: hematochezia  HPI:  Davina Stinson is a 53 y.o. female with anxiety, depression, DVT, hypertension presents today for evaluation for blood clots in stool.  Symptoms have occurred sporadically for the past couple of months.  Most recent episode started yesterday.  Patient states she has the urge to have a bowel movement but blood clots come out instead of stool.  Prior to yesterday stools have been normal.  No bright red blood.  No pain with bowel movements.  No abdominal pain.  No nausea or vomiting.  No unintentional weight loss.  Last episode occurred last month.  No personal or family history of colon or rectal cancer.  She has never had a colonoscopy.  She does have hemorrhoids.  She is not on blood thinners.    Past Medical History:   Diagnosis Date    Anxiety     Depression     DVT (deep venous thrombosis) 2016    Right - unknown cause    Hypertension      Past Surgical History:   Procedure Laterality Date    PARTIAL HYSTERECTOMY  2007    REDUCTION OF BOTH BREASTS  2006     Family History   Problem Relation Age of Onset    Hypertension Mother     Heart disease Mother         afib    Stroke Mother     Lung cancer Mother     Diabetes Brother     Hypertension Brother     Diabetes Maternal Aunt     Diabetes Maternal Uncle     Heart disease Father          from a staff infection in the hospital    Hypertension Brother     Hypertension Brother     Diabetes Brother      Social History     Socioeconomic History    Marital status:     Number of children: 1    Highest education level: Some college, no degree   Occupational History    Occupation: Supervisor at [employer]   Tobacco Use    Smoking status: Never    Smokeless tobacco: Never   Substance and Sexual Activity    Alcohol use: Not Currently    Drug use: Not Currently    Sexual activity: Yes     Partners: Male       Current Outpatient  "Medications   Medication Sig Dispense Refill    amLODIPine (NORVASC) 5 MG tablet TAKE 1 TABLET(5 MG) BY MOUTH EVERY DAY 30 tablet 3    clonazePAM (KLONOPIN) 0.5 MG tablet Take 1 tablet (0.5 mg total) by mouth 2 (two) times daily as needed for Anxiety. 60 tablet 2    gabapentin (NEURONTIN) 800 MG tablet Take 1 tablet (800 mg total) by mouth 3 (three) times daily. 270 tablet 3    hydroCHLOROthiazide (HYDRODIURIL) 25 MG tablet Take 1 tablet (25 mg total) by mouth once daily. 30 tablet 0    losartan (COZAAR) 100 MG tablet Take 1 tablet (100 mg total) by mouth once daily. 30 tablet 0    paroxetine (PAXIL) 20 MG tablet Take 1 tablet (20 mg total) by mouth every morning. 90 tablet 3     No current facility-administered medications for this visit.     Review of patient's allergies indicates:   Allergen Reactions    Sulfa (sulfonamide antibiotics)        Review of Systems   Constitutional:  Negative for activity change, appetite change, chills and fever.   HENT:  Negative for congestion, dental problem and ear discharge.    Eyes:  Negative for discharge and itching.   Respiratory:  Negative for apnea, choking and chest tightness.    Cardiovascular:  Negative for chest pain and leg swelling.   Gastrointestinal:  Positive for blood in stool. Negative for abdominal distention, abdominal pain, anal bleeding, constipation, diarrhea and nausea.   Endocrine: Negative for cold intolerance and heat intolerance.   Genitourinary:  Negative for difficulty urinating and dyspareunia.   Musculoskeletal:  Negative for arthralgias and back pain.   Skin:  Negative for color change and pallor.   Neurological:  Negative for dizziness and facial asymmetry.   Hematological:  Negative for adenopathy. Does not bruise/bleed easily.   Psychiatric/Behavioral:  Negative for agitation and behavioral problems.      Objective:      Vitals:    05/03/23 0830   BP: 122/83   Pulse: 77   SpO2: 95%   Weight: 101.6 kg (224 lb)   Height: 5' 7" (1.702 m) "     Physical Exam  Constitutional:       General: She is not in acute distress.     Appearance: She is well-developed.   HENT:      Head: Normocephalic and atraumatic.   Eyes:      Pupils: Pupils are equal, round, and reactive to light.   Neck:      Thyroid: No thyromegaly.   Cardiovascular:      Rate and Rhythm: Normal rate and regular rhythm.   Pulmonary:      Effort: Pulmonary effort is normal.      Breath sounds: Normal breath sounds.   Abdominal:      General: Bowel sounds are normal. There is no distension.      Palpations: Abdomen is soft.      Tenderness: There is no abdominal tenderness.   Musculoskeletal:         General: No deformity. Normal range of motion.      Cervical back: Normal range of motion and neck supple.   Skin:     General: Skin is warm.      Capillary Refill: Capillary refill takes less than 2 seconds.      Findings: No erythema.   Neurological:      Mental Status: She is alert and oriented to person, place, and time.      Cranial Nerves: No cranial nerve deficit.   Psychiatric:         Behavior: Behavior normal.       Lab Review:  None recent  Diagnostics Review:  None recent     Assessment:       1. Hematochezia        Plan:   Hematochezia  -     Full code; Standing  -     Place in Outpatient; Standing  -     Case Request Operating Room: COLONOSCOPY  -     Vital signs; Standing  -     Insert peripheral IV; Standing  -     Notify Physician; Standing  -     Diet NPO; Standing  -     Place CHANTEL hose; Standing  -     Place sequential compression device; Standing    Other orders  -     0.9%  NaCl infusion        Medical Decision Making/Counseling:  Recommend diagnostic colonoscopy for hematochezia.  Obtain CBC and CMP.    Risks and benefits of endoscopy were discussed in depth in clinic.  From a procedural standpoint, we discuss the benefits of colonoscopy to be finding colon cancers at early stages, including polyps which can be endoscopically resectable, to finding early stage colon cancers  which can be better treated with current medical and surgical therapies in order to give patients a longer survival, if found in these early stages.  From a standpoint of risks, the risk of bleeding and perforation of the colon were discussed.  I personally discussed that if complications of bleeding or perforation were to occur, the patient could need as little as a blood transfusion and as much as possible hospital admission, repeat procedure, or even surgery.     Patient instructed that best way to communicate with my office staff is for patient to get on the [facility] patient portal to expedite communication and communication issues that may occur.  Patient was given instructions on how to get on the portal.  I encouraged patient to obtain portal access as well.  Ultimately it is up to the patient to obtain access.  Patient voiced understanding.

## 2023-05-03 NOTE — H&P (VIEW-ONLY)
[facility] Surgery H&P Note    Subjective:       Patient ID: Davina Stinson is a 53 y.o. female.    Chief Complaint: hematochezia  HPI:  Davina Stinson is a 53 y.o. female with anxiety, depression, DVT, hypertension presents today for evaluation for blood clots in stool.  Symptoms have occurred sporadically for the past couple of months.  Most recent episode started yesterday.  Patient states she has the urge to have a bowel movement but blood clots come out instead of stool.  Prior to yesterday stools have been normal.  No bright red blood.  No pain with bowel movements.  No abdominal pain.  No nausea or vomiting.  No unintentional weight loss.  Last episode occurred last month.  No personal or family history of colon or rectal cancer.  She has never had a colonoscopy.  She does have hemorrhoids.  She is not on blood thinners.    Past Medical History:   Diagnosis Date    Anxiety     Depression     DVT (deep venous thrombosis) 2016    Right - unknown cause    Hypertension      Past Surgical History:   Procedure Laterality Date    PARTIAL HYSTERECTOMY  2007    REDUCTION OF BOTH BREASTS  2006     Family History   Problem Relation Age of Onset    Hypertension Mother     Heart disease Mother         afib    Stroke Mother     Lung cancer Mother     Diabetes Brother     Hypertension Brother     Diabetes Maternal Aunt     Diabetes Maternal Uncle     Heart disease Father          from a staff infection in the hospital    Hypertension Brother     Hypertension Brother     Diabetes Brother      Social History     Socioeconomic History    Marital status:     Number of children: 1    Highest education level: Some college, no degree   Occupational History    Occupation: Supervisor at [employer]   Tobacco Use    Smoking status: Never    Smokeless tobacco: Never   Substance and Sexual Activity    Alcohol use: Not Currently    Drug use: Not Currently    Sexual activity: Yes     Partners: Male       Current Outpatient  "Medications   Medication Sig Dispense Refill    amLODIPine (NORVASC) 5 MG tablet TAKE 1 TABLET(5 MG) BY MOUTH EVERY DAY 30 tablet 3    clonazePAM (KLONOPIN) 0.5 MG tablet Take 1 tablet (0.5 mg total) by mouth 2 (two) times daily as needed for Anxiety. 60 tablet 2    gabapentin (NEURONTIN) 800 MG tablet Take 1 tablet (800 mg total) by mouth 3 (three) times daily. 270 tablet 3    hydroCHLOROthiazide (HYDRODIURIL) 25 MG tablet Take 1 tablet (25 mg total) by mouth once daily. 30 tablet 0    losartan (COZAAR) 100 MG tablet Take 1 tablet (100 mg total) by mouth once daily. 30 tablet 0    paroxetine (PAXIL) 20 MG tablet Take 1 tablet (20 mg total) by mouth every morning. 90 tablet 3     No current facility-administered medications for this visit.     Review of patient's allergies indicates:   Allergen Reactions    Sulfa (sulfonamide antibiotics)        Review of Systems   Constitutional:  Negative for activity change, appetite change, chills and fever.   HENT:  Negative for congestion, dental problem and ear discharge.    Eyes:  Negative for discharge and itching.   Respiratory:  Negative for apnea, choking and chest tightness.    Cardiovascular:  Negative for chest pain and leg swelling.   Gastrointestinal:  Positive for blood in stool. Negative for abdominal distention, abdominal pain, anal bleeding, constipation, diarrhea and nausea.   Endocrine: Negative for cold intolerance and heat intolerance.   Genitourinary:  Negative for difficulty urinating and dyspareunia.   Musculoskeletal:  Negative for arthralgias and back pain.   Skin:  Negative for color change and pallor.   Neurological:  Negative for dizziness and facial asymmetry.   Hematological:  Negative for adenopathy. Does not bruise/bleed easily.   Psychiatric/Behavioral:  Negative for agitation and behavioral problems.      Objective:      Vitals:    05/03/23 0830   BP: 122/83   Pulse: 77   SpO2: 95%   Weight: 101.6 kg (224 lb)   Height: 5' 7" (1.702 m) "     Physical Exam  Constitutional:       General: She is not in acute distress.     Appearance: She is well-developed.   HENT:      Head: Normocephalic and atraumatic.   Eyes:      Pupils: Pupils are equal, round, and reactive to light.   Neck:      Thyroid: No thyromegaly.   Cardiovascular:      Rate and Rhythm: Normal rate and regular rhythm.   Pulmonary:      Effort: Pulmonary effort is normal.      Breath sounds: Normal breath sounds.   Abdominal:      General: Bowel sounds are normal. There is no distension.      Palpations: Abdomen is soft.      Tenderness: There is no abdominal tenderness.   Musculoskeletal:         General: No deformity. Normal range of motion.      Cervical back: Normal range of motion and neck supple.   Skin:     General: Skin is warm.      Capillary Refill: Capillary refill takes less than 2 seconds.      Findings: No erythema.   Neurological:      Mental Status: She is alert and oriented to person, place, and time.      Cranial Nerves: No cranial nerve deficit.   Psychiatric:         Behavior: Behavior normal.       Lab Review:  None recent  Diagnostics Review:  None recent     Assessment:       1. Hematochezia        Plan:   Hematochezia  -     Full code; Standing  -     Place in Outpatient; Standing  -     Case Request Operating Room: COLONOSCOPY  -     Vital signs; Standing  -     Insert peripheral IV; Standing  -     Notify Physician; Standing  -     Diet NPO; Standing  -     Place CHANTEL hose; Standing  -     Place sequential compression device; Standing    Other orders  -     0.9%  NaCl infusion        Medical Decision Making/Counseling:  Recommend diagnostic colonoscopy for hematochezia.  Obtain CBC and CMP.    Risks and benefits of endoscopy were discussed in depth in clinic.  From a procedural standpoint, we discuss the benefits of colonoscopy to be finding colon cancers at early stages, including polyps which can be endoscopically resectable, to finding early stage colon cancers  which can be better treated with current medical and surgical therapies in order to give patients a longer survival, if found in these early stages.  From a standpoint of risks, the risk of bleeding and perforation of the colon were discussed.  I personally discussed that if complications of bleeding or perforation were to occur, the patient could need as little as a blood transfusion and as much as possible hospital admission, repeat procedure, or even surgery.     Patient instructed that best way to communicate with my office staff is for patient to get on the [facility] patient portal to expedite communication and communication issues that may occur.  Patient was given instructions on how to get on the portal.  I encouraged patient to obtain portal access as well.  Ultimately it is up to the patient to obtain access.  Patient voiced understanding.

## 2023-05-03 NOTE — PROGRESS NOTES
Patient, Davina Stinson, was instructed on Miralax bowel prep. Patient was given instructions on pre-op, qing-op, and post-op scheduled appointments.  Patient received blue folder containing all written instructions.  Colonoscopy scheduled for May 23, 2023    Adalid Fischer MA

## 2023-05-15 ENCOUNTER — OFFICE VISIT (OUTPATIENT)
Dept: FAMILY MEDICINE | Facility: CLINIC | Age: 53
End: 2023-05-15

## 2023-05-15 VITALS
BODY MASS INDEX: 34.84 KG/M2 | RESPIRATION RATE: 18 BRPM | HEART RATE: 70 BPM | OXYGEN SATURATION: 97 % | DIASTOLIC BLOOD PRESSURE: 83 MMHG | SYSTOLIC BLOOD PRESSURE: 132 MMHG | WEIGHT: 222 LBS | HEIGHT: 67 IN

## 2023-05-15 DIAGNOSIS — Z79.899 BENZODIAZEPINE CONTRACT EXISTS: ICD-10-CM

## 2023-05-15 DIAGNOSIS — Z13.31 POSITIVE DEPRESSION SCREENING: ICD-10-CM

## 2023-05-15 DIAGNOSIS — I10 ESSENTIAL HYPERTENSION: ICD-10-CM

## 2023-05-15 DIAGNOSIS — F41.1 GAD (GENERALIZED ANXIETY DISORDER): ICD-10-CM

## 2023-05-15 DIAGNOSIS — F51.01 PRIMARY INSOMNIA: ICD-10-CM

## 2023-05-15 PROCEDURE — 99999 PR PBB SHADOW E&M-EST. PATIENT-LVL IV: CPT | Mod: PBBFAC,,, | Performed by: NURSE PRACTITIONER

## 2023-05-15 PROCEDURE — 99213 PR OFFICE/OUTPT VISIT, EST, LEVL III, 20-29 MIN: ICD-10-PCS | Mod: S$PBB,,, | Performed by: NURSE PRACTITIONER

## 2023-05-15 PROCEDURE — 99214 OFFICE O/P EST MOD 30 MIN: CPT | Mod: PBBFAC | Performed by: NURSE PRACTITIONER

## 2023-05-15 PROCEDURE — 99213 OFFICE O/P EST LOW 20 MIN: CPT | Mod: S$PBB,,, | Performed by: NURSE PRACTITIONER

## 2023-05-15 PROCEDURE — 99999 PR PBB SHADOW E&M-EST. PATIENT-LVL IV: ICD-10-PCS | Mod: PBBFAC,,, | Performed by: NURSE PRACTITIONER

## 2023-05-15 RX ORDER — AMLODIPINE BESYLATE 5 MG/1
5 TABLET ORAL DAILY
Qty: 90 TABLET | Refills: 3 | Status: SHIPPED | OUTPATIENT
Start: 2023-05-15 | End: 2024-03-22 | Stop reason: SDUPTHER

## 2023-05-15 RX ORDER — CLONAZEPAM 0.5 MG/1
0.5 TABLET ORAL 2 TIMES DAILY PRN
Qty: 60 TABLET | Refills: 2 | Status: SHIPPED | OUTPATIENT
Start: 2023-05-15 | End: 2023-08-14

## 2023-05-15 RX ORDER — PAROXETINE HYDROCHLORIDE 20 MG/1
20 TABLET, FILM COATED ORAL EVERY MORNING
Qty: 90 TABLET | Refills: 3 | Status: SHIPPED | OUTPATIENT
Start: 2023-05-15 | End: 2023-11-14

## 2023-05-15 NOTE — PROGRESS NOTES
Subjective:       Patient ID: Davina Stinson is a 53 y.o. female.    Chief Complaint: New Patient , Hypertension, Back Pain (Slip and fell 23 and hurt low back ), labs to be done (Dr. Hooper has put in a lab order, per patient she would like to have a full thing of labs done. She is scheduled 23 for labs), Anxiety, and Medication Refill  -  The patient is a 54 y/o female that presents for follow up. At last visit xanax was changed to klonopin and paxil was inc to 20 mg yet states she did not receive thus resent with message to cancel all outstanding rx.     Scheduled for upcoming colonoscopy initially sent as a screening yet now reports passing blood clots and bleeding a lot.     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    Occupation: Working at [employer]-2023   Tobacco Use    Smoking status: Never    Smokeless tobacco: Never   Substance and Sexual Activity    Alcohol use: Not Currently    Drug use: Not Currently    Sexual activity: Yes     Partners: Male       Family History   Problem Relation Age of Onset    Hypertension Mother     Heart disease Mother         afib    Stroke Mother     Lung cancer Mother     Diabetes Brother     Hypertension Brother     Diabetes Maternal Aunt     Diabetes Maternal Uncle     Heart disease Father          from a staff infection in the hospital    Hypertension Brother     Hypertension Brother     Diabetes Brother        Review of patient's allergies  indicates:   Allergen Reactions    Sulfa (sulfonamide antibiotics)           Current Outpatient Medications:     gabapentin (NEURONTIN) 800 MG tablet, Take 1 tablet (800 mg total) by mouth 3 (three) times daily., Disp: 270 tablet, Rfl: 3    hydroCHLOROthiazide (HYDRODIURIL) 25 MG tablet, Take 1 tablet (25 mg total) by mouth once daily., Disp: 30 tablet, Rfl: 0    losartan (COZAAR) 100 MG tablet, Take 1 tablet (100 mg total) by mouth once daily., Disp: 30 tablet, Rfl: 0    amLODIPine (NORVASC) 5 MG tablet, Take 1 tablet (5 mg total) by mouth once daily., Disp: 90 tablet, Rfl: 3    clonazePAM (KLONOPIN) 0.5 MG tablet, Take 1 tablet (0.5 mg total) by mouth 2 (two) times daily as needed for Anxiety., Disp: 60 tablet, Rfl: 2    paroxetine (PAXIL) 20 MG tablet, Take 1 tablet (20 mg total) by mouth every morning., Disp: 90 tablet, Rfl: 3    Hypertension  Associated symptoms include anxiety.   Back Pain    Anxiety        Medication Refill    Review of Systems   Constitutional: Negative.    HENT: Negative.     Eyes: Negative.    Respiratory: Negative.     Cardiovascular: Negative.    Gastrointestinal:  Positive for blood in stool.   Endocrine: Negative.    Genitourinary: Negative.    Musculoskeletal:  Positive for back pain.   Skin: Negative.    Allergic/Immunologic: Negative.    Neurological: Negative.    Hematological: Negative.    Psychiatric/Behavioral: Negative.       Objective:      Physical Exam  Vitals and nursing note reviewed.   Constitutional:       General: She is not in acute distress.     Appearance: Normal appearance. She is well-developed. She is obese. She is not ill-appearing.   HENT:      Head: Normocephalic.   Eyes:      Conjunctiva/sclera: Conjunctivae normal.   Neck:      Thyroid: No thyromegaly.   Cardiovascular:      Rate and Rhythm: Normal rate and regular rhythm.      Heart sounds: Normal heart sounds. No murmur heard.  Pulmonary:      Effort: Pulmonary effort is normal.      Breath sounds: Normal  breath sounds. No wheezing or rales.   Musculoskeletal:         General: Normal range of motion.      Cervical back: Normal range of motion.      Right lower leg: No edema.      Left lower leg: No edema.   Skin:     General: Skin is warm and dry.   Neurological:      Mental Status: She is alert and oriented to person, place, and time. Mental status is at baseline.   Psychiatric:         Mood and Affect: Mood normal.         Behavior: Behavior normal.         Thought Content: Thought content normal.         Judgment: Judgment normal.       Assessment:       1. ANKIT (generalized anxiety disorder)    2. Primary insomnia    3. Positive depression screening    4. Essential hypertension    5. Benzodiazepine contract exists        Plan:     1- 5/23/23- colonoscopy  2- mammo to be scheduled that was ordered 1/3/23  3- fasting labs tomorrow  4- again stop xanax and start klonopin  5- again inc paxil to 20 mg  6- UDS tomorrow in lab despite should be neg.  7-RTC 3 mo for refills  -     ANKIT (generalized anxiety disorder)  -     clonazePAM (KLONOPIN) 0.5 MG tablet; Take 1 tablet (0.5 mg total) by mouth 2 (two) times daily as needed for Anxiety.  Dispense: 60 tablet; Refill: 2  -     paroxetine (PAXIL) 20 MG tablet; Take 1 tablet (20 mg total) by mouth every morning.  Dispense: 90 tablet; Refill: 3  -     Cancel: Drug screen panel, in-house  -     Drug screen panel, in-house; Future; Expected date: 05/15/2023    Primary insomnia  -     clonazePAM (KLONOPIN) 0.5 MG tablet; Take 1 tablet (0.5 mg total) by mouth 2 (two) times daily as needed for Anxiety.  Dispense: 60 tablet; Refill: 2  -     Cancel: Drug screen panel, in-house  -     Drug screen panel, in-house; Future; Expected date: 05/15/2023    Positive depression screening  Comments:  I have reviewed the positive depression score which warrants active treatment with psychotherapy and/or medications.  Orders:  -     paroxetine (PAXIL) 20 MG tablet; Take 1 tablet (20 mg total)  by mouth every morning.  Dispense: 90 tablet; Refill: 3    Essential hypertension  -     amLODIPine (NORVASC) 5 MG tablet; Take 1 tablet (5 mg total) by mouth once daily.  Dispense: 90 tablet; Refill: 3    Benzodiazepine contract exists  -     Cancel: Drug screen panel, in-house  -     Drug screen panel, in-house; Future; Expected date: 05/15/2023        Risks, benefits, and side effects were discussed with the patient. All questions were answered to the fullest satisfaction of the patient, and pt verbalized understanding and agreement to treatment plan. Pt was to call with any new or worsening symptoms, or present to the ER.

## 2023-05-16 ENCOUNTER — LAB VISIT (OUTPATIENT)
Dept: LAB | Facility: HOSPITAL | Age: 53
End: 2023-05-16
Attending: NURSE PRACTITIONER

## 2023-05-16 DIAGNOSIS — F51.01 PRIMARY INSOMNIA: ICD-10-CM

## 2023-05-16 DIAGNOSIS — I10 ESSENTIAL HYPERTENSION: ICD-10-CM

## 2023-05-16 DIAGNOSIS — Z13.6 ENCOUNTER FOR LIPID SCREENING FOR CARDIOVASCULAR DISEASE: ICD-10-CM

## 2023-05-16 DIAGNOSIS — Z13.1 SCREENING FOR DIABETES MELLITUS (DM): ICD-10-CM

## 2023-05-16 DIAGNOSIS — F41.1 GAD (GENERALIZED ANXIETY DISORDER): ICD-10-CM

## 2023-05-16 DIAGNOSIS — Z79.899 HIGH RISK MEDICATION USE: ICD-10-CM

## 2023-05-16 DIAGNOSIS — E55.9 VITAMIN D DEFICIENCY: ICD-10-CM

## 2023-05-16 DIAGNOSIS — Z79.899 BENZODIAZEPINE CONTRACT EXISTS: ICD-10-CM

## 2023-05-16 DIAGNOSIS — Z13.220 ENCOUNTER FOR LIPID SCREENING FOR CARDIOVASCULAR DISEASE: ICD-10-CM

## 2023-05-16 LAB
25(OH)D3+25(OH)D2 SERPL-MCNC: 22 NG/ML (ref 30–96)
ALBUMIN SERPL BCP-MCNC: 3.9 G/DL (ref 3.5–5.2)
ALP SERPL-CCNC: 71 U/L (ref 55–135)
ALT SERPL W/O P-5'-P-CCNC: 23 U/L (ref 10–44)
AMPHET+METHAMPHET UR QL: NEGATIVE
ANION GAP SERPL CALC-SCNC: 11 MMOL/L (ref 8–16)
AST SERPL-CCNC: 18 U/L (ref 10–40)
BARBITURATES UR QL SCN>200 NG/ML: NEGATIVE
BASOPHILS # BLD AUTO: 0.08 K/UL (ref 0–0.2)
BASOPHILS NFR BLD: 1.6 % (ref 0–1.9)
BENZODIAZ UR QL SCN>200 NG/ML: NEGATIVE
BILIRUB SERPL-MCNC: 0.5 MG/DL (ref 0.1–1)
BUN SERPL-MCNC: 16 MG/DL (ref 6–20)
BZE UR QL SCN: NEGATIVE
CALCIUM SERPL-MCNC: 9.1 MG/DL (ref 8.7–10.5)
CANNABINOIDS UR QL SCN: NEGATIVE
CHLORIDE SERPL-SCNC: 102 MMOL/L (ref 95–110)
CHOLEST SERPL-MCNC: 187 MG/DL (ref 120–199)
CHOLEST/HDLC SERPL: 4 {RATIO} (ref 2–5)
CO2 SERPL-SCNC: 26 MMOL/L (ref 23–29)
CREAT SERPL-MCNC: 1.6 MG/DL (ref 0.5–1.4)
CREAT UR-MCNC: 44.7 MG/DL (ref 15–325)
DIFFERENTIAL METHOD: NORMAL
EOSINOPHIL # BLD AUTO: 0.2 K/UL (ref 0–0.5)
EOSINOPHIL NFR BLD: 3.5 % (ref 0–8)
ERYTHROCYTE [DISTWIDTH] IN BLOOD BY AUTOMATED COUNT: 13.4 % (ref 11.5–14.5)
EST. GFR  (NO RACE VARIABLE): 38.1 ML/MIN/1.73 M^2
ESTIMATED AVG GLUCOSE: 128 MG/DL (ref 68–131)
GLUCOSE SERPL-MCNC: 120 MG/DL (ref 70–110)
HBA1C MFR BLD: 6.1 % (ref 4–5.6)
HCT VFR BLD AUTO: 39.2 % (ref 37–48.5)
HDLC SERPL-MCNC: 47 MG/DL (ref 40–75)
HDLC SERPL: 25.1 % (ref 20–50)
HGB BLD-MCNC: 13.2 G/DL (ref 12–16)
IMM GRANULOCYTES # BLD AUTO: 0.01 K/UL (ref 0–0.04)
IMM GRANULOCYTES NFR BLD AUTO: 0.2 % (ref 0–0.5)
LDLC SERPL CALC-MCNC: 122 MG/DL (ref 63–159)
LYMPHOCYTES # BLD AUTO: 1.7 K/UL (ref 1–4.8)
LYMPHOCYTES NFR BLD: 33.7 % (ref 18–48)
MCH RBC QN AUTO: 30.9 PG (ref 27–31)
MCHC RBC AUTO-ENTMCNC: 33.7 G/DL (ref 32–36)
MCV RBC AUTO: 92 FL (ref 82–98)
METHADONE UR QL SCN>300 NG/ML: NEGATIVE
MONOCYTES # BLD AUTO: 0.4 K/UL (ref 0.3–1)
MONOCYTES NFR BLD: 7.6 % (ref 4–15)
NEUTROPHILS # BLD AUTO: 2.8 K/UL (ref 1.8–7.7)
NEUTROPHILS NFR BLD: 53.4 % (ref 38–73)
NONHDLC SERPL-MCNC: 140 MG/DL
NRBC BLD-RTO: 0 /100 WBC
OPIATES UR QL SCN: NEGATIVE
PCP UR QL SCN>25 NG/ML: NEGATIVE
PLATELET # BLD AUTO: 241 K/UL (ref 150–450)
PMV BLD AUTO: 11.5 FL (ref 9.2–12.9)
POTASSIUM SERPL-SCNC: 4 MMOL/L (ref 3.5–5.1)
PROT SERPL-MCNC: 6.9 G/DL (ref 6–8.4)
RBC # BLD AUTO: 4.27 M/UL (ref 4–5.4)
SODIUM SERPL-SCNC: 139 MMOL/L (ref 136–145)
T4 FREE SERPL-MCNC: 1.11 NG/DL (ref 0.71–1.51)
TOXICOLOGY INFORMATION: NORMAL
TRIGL SERPL-MCNC: 90 MG/DL (ref 30–150)
TSH SERPL DL<=0.005 MIU/L-ACNC: 3.26 UIU/ML (ref 0.4–4)
WBC # BLD AUTO: 5.14 K/UL (ref 3.9–12.7)

## 2023-05-16 PROCEDURE — 83036 HEMOGLOBIN GLYCOSYLATED A1C: CPT | Performed by: NURSE PRACTITIONER

## 2023-05-16 PROCEDURE — 84439 ASSAY OF FREE THYROXINE: CPT | Performed by: NURSE PRACTITIONER

## 2023-05-16 PROCEDURE — 80061 LIPID PANEL: CPT | Performed by: NURSE PRACTITIONER

## 2023-05-16 PROCEDURE — 84443 ASSAY THYROID STIM HORMONE: CPT | Performed by: NURSE PRACTITIONER

## 2023-05-16 PROCEDURE — 80307 DRUG TEST PRSMV CHEM ANLYZR: CPT | Performed by: NURSE PRACTITIONER

## 2023-05-16 PROCEDURE — 36415 COLL VENOUS BLD VENIPUNCTURE: CPT | Performed by: NURSE PRACTITIONER

## 2023-05-16 PROCEDURE — 80053 COMPREHEN METABOLIC PANEL: CPT | Performed by: NURSE PRACTITIONER

## 2023-05-16 PROCEDURE — 82306 VITAMIN D 25 HYDROXY: CPT | Performed by: NURSE PRACTITIONER

## 2023-05-16 PROCEDURE — 85025 COMPLETE CBC W/AUTO DIFF WBC: CPT | Performed by: NURSE PRACTITIONER

## 2023-05-22 DIAGNOSIS — I10 ESSENTIAL HYPERTENSION: ICD-10-CM

## 2023-05-23 ENCOUNTER — ANESTHESIA EVENT (OUTPATIENT)
Dept: SURGERY | Facility: HOSPITAL | Age: 53
End: 2023-05-23

## 2023-05-23 ENCOUNTER — ANESTHESIA (OUTPATIENT)
Dept: SURGERY | Facility: HOSPITAL | Age: 53
End: 2023-05-23

## 2023-05-23 ENCOUNTER — HOSPITAL ENCOUNTER (OUTPATIENT)
Facility: HOSPITAL | Age: 53
Discharge: HOME | End: 2023-05-23
Attending: STUDENT IN AN ORGANIZED HEALTH CARE EDUCATION/TRAINING PROGRAM | Admitting: STUDENT IN AN ORGANIZED HEALTH CARE EDUCATION/TRAINING PROGRAM

## 2023-05-23 VITALS
SYSTOLIC BLOOD PRESSURE: 119 MMHG | RESPIRATION RATE: 15 BRPM | HEIGHT: 67 IN | BODY MASS INDEX: 34.06 KG/M2 | HEART RATE: 49 BPM | WEIGHT: 217 LBS | TEMPERATURE: 98 F | OXYGEN SATURATION: 98 % | DIASTOLIC BLOOD PRESSURE: 86 MMHG

## 2023-05-23 DIAGNOSIS — Z01.818 PRE-OP TESTING: ICD-10-CM

## 2023-05-23 DIAGNOSIS — K92.1 HEMATOCHEZIA: ICD-10-CM

## 2023-05-23 PROCEDURE — D9220A PRA ANESTHESIA: Mod: ,,, | Performed by: ANESTHESIOLOGY

## 2023-05-23 PROCEDURE — D9220A PRA ANESTHESIA: ICD-10-PCS | Mod: ,,, | Performed by: ANESTHESIOLOGY

## 2023-05-23 PROCEDURE — 45378 PR COLONOSCOPY,DIAGNOSTIC: ICD-10-PCS | Mod: ,,, | Performed by: STUDENT IN AN ORGANIZED HEALTH CARE EDUCATION/TRAINING PROGRAM

## 2023-05-23 PROCEDURE — 45378 DIAGNOSTIC COLONOSCOPY: CPT | Mod: ,,, | Performed by: STUDENT IN AN ORGANIZED HEALTH CARE EDUCATION/TRAINING PROGRAM

## 2023-05-23 PROCEDURE — 45378 DIAGNOSTIC COLONOSCOPY: CPT | Performed by: STUDENT IN AN ORGANIZED HEALTH CARE EDUCATION/TRAINING PROGRAM

## 2023-05-23 PROCEDURE — 93005 ELECTROCARDIOGRAM TRACING: CPT

## 2023-05-23 PROCEDURE — 93010 EKG 12-LEAD: ICD-10-PCS | Mod: ,,, | Performed by: INTERNAL MEDICINE

## 2023-05-23 PROCEDURE — 37000009 HC ANESTHESIA EA ADD 15 MINS: Performed by: STUDENT IN AN ORGANIZED HEALTH CARE EDUCATION/TRAINING PROGRAM

## 2023-05-23 PROCEDURE — 25000003 PHARM REV CODE 250: Performed by: NURSE ANESTHETIST, CERTIFIED REGISTERED

## 2023-05-23 PROCEDURE — 37000008 HC ANESTHESIA 1ST 15 MINUTES: Performed by: STUDENT IN AN ORGANIZED HEALTH CARE EDUCATION/TRAINING PROGRAM

## 2023-05-23 PROCEDURE — 93010 ELECTROCARDIOGRAM REPORT: CPT | Mod: ,,, | Performed by: INTERNAL MEDICINE

## 2023-05-23 PROCEDURE — 63600175 PHARM REV CODE 636 W HCPCS: Performed by: NURSE ANESTHETIST, CERTIFIED REGISTERED

## 2023-05-23 RX ORDER — SODIUM CHLORIDE, SODIUM LACTATE, POTASSIUM CHLORIDE, CALCIUM CHLORIDE 600; 310; 30; 20 MG/100ML; MG/100ML; MG/100ML; MG/100ML
INJECTION, SOLUTION INTRAVENOUS CONTINUOUS
Status: CANCELLED | OUTPATIENT
Start: 2023-05-23

## 2023-05-23 RX ORDER — SODIUM CHLORIDE 9 MG/ML
INJECTION, SOLUTION INTRAVENOUS CONTINUOUS
Status: DISCONTINUED | OUTPATIENT
Start: 2023-05-23 | End: 2023-05-23 | Stop reason: HOSPADM

## 2023-05-23 RX ORDER — LIDOCAINE HYDROCHLORIDE 10 MG/ML
1 INJECTION, SOLUTION EPIDURAL; INFILTRATION; INTRACAUDAL; PERINEURAL ONCE
Status: CANCELLED | OUTPATIENT
Start: 2023-05-23 | End: 2023-05-23

## 2023-05-23 RX ORDER — ONDANSETRON 2 MG/ML
4 INJECTION INTRAMUSCULAR; INTRAVENOUS DAILY PRN
Status: DISCONTINUED | OUTPATIENT
Start: 2023-05-23 | End: 2023-05-23 | Stop reason: HOSPADM

## 2023-05-23 RX ORDER — SODIUM CHLORIDE, SODIUM LACTATE, POTASSIUM CHLORIDE, CALCIUM CHLORIDE 600; 310; 30; 20 MG/100ML; MG/100ML; MG/100ML; MG/100ML
125 INJECTION, SOLUTION INTRAVENOUS CONTINUOUS
Status: DISCONTINUED | OUTPATIENT
Start: 2023-05-23 | End: 2023-05-23 | Stop reason: HOSPADM

## 2023-05-23 RX ORDER — HYDROCHLOROTHIAZIDE 25 MG/1
TABLET ORAL
Qty: 90 TABLET | Refills: 3 | Status: SHIPPED | OUTPATIENT
Start: 2023-05-23

## 2023-05-23 RX ORDER — SODIUM CHLORIDE, SODIUM LACTATE, POTASSIUM CHLORIDE, CALCIUM CHLORIDE 600; 310; 30; 20 MG/100ML; MG/100ML; MG/100ML; MG/100ML
INJECTION, SOLUTION INTRAVENOUS CONTINUOUS PRN
Status: DISCONTINUED | OUTPATIENT
Start: 2023-05-23 | End: 2023-05-23

## 2023-05-23 RX ORDER — LIDOCAINE HYDROCHLORIDE 20 MG/ML
INJECTION, SOLUTION EPIDURAL; INFILTRATION; INTRACAUDAL; PERINEURAL
Status: DISCONTINUED | OUTPATIENT
Start: 2023-05-23 | End: 2023-05-23

## 2023-05-23 RX ORDER — LOSARTAN POTASSIUM 100 MG/1
TABLET ORAL
Qty: 90 TABLET | Refills: 3 | Status: SHIPPED | OUTPATIENT
Start: 2023-05-23

## 2023-05-23 RX ORDER — DIPHENHYDRAMINE HYDROCHLORIDE 50 MG/ML
12.5 INJECTION INTRAMUSCULAR; INTRAVENOUS
Status: DISCONTINUED | OUTPATIENT
Start: 2023-05-23 | End: 2023-05-23 | Stop reason: HOSPADM

## 2023-05-23 RX ORDER — PROPOFOL 10 MG/ML
VIAL (ML) INTRAVENOUS
Status: DISCONTINUED | OUTPATIENT
Start: 2023-05-23 | End: 2023-05-23

## 2023-05-23 RX ADMIN — PROPOFOL 50 MG: 10 INJECTION, EMULSION INTRAVENOUS at 08:05

## 2023-05-23 RX ADMIN — LIDOCAINE HYDROCHLORIDE 100 MG: 20 INJECTION, SOLUTION EPIDURAL; INFILTRATION; INTRACAUDAL; PERINEURAL at 08:05

## 2023-05-23 RX ADMIN — SODIUM CHLORIDE, POTASSIUM CHLORIDE, SODIUM LACTATE AND CALCIUM CHLORIDE: 600; 310; 30; 20 INJECTION, SOLUTION INTRAVENOUS at 07:05

## 2023-05-23 RX ADMIN — PROPOFOL 100 MG: 10 INJECTION, EMULSION INTRAVENOUS at 08:05

## 2023-05-23 NOTE — DISCHARGE INSTRUCTIONS

## 2023-05-23 NOTE — TRANSFER OF CARE
"Anesthesia Transfer of Care Note    Patient: Davina Stinson    Procedure(s) Performed: Procedure(s) (LRB):  COLONOSCOPY (N/A)    Patient location: PACU    Anesthesia Type: general    Transport from OR: Transported from OR on room air with adequate spontaneous ventilation    Post pain: adequate analgesia    Post assessment: no apparent anesthetic complications and tolerated procedure well    Post vital signs: stable    Level of consciousness: sedated and responds to stimulation    Nausea/Vomiting: no nausea/vomiting    Complications: none    Transfer of care protocol was followed      Last vitals:   Visit Vitals  /89   Pulse 61   Temp 36.8 °C (98.3 °F) (Oral)   Resp 18   Ht 5' 7" (1.702 m)   Wt 98.4 kg (217 lb)   LMP 04/12/2006 (Approximate)   SpO2 97%   Breastfeeding No   BMI 33.99 kg/m²     "

## 2023-05-23 NOTE — PLAN OF CARE
Has met unit/department guidelines for discharge from each phase of the post procedure continuum. Leaving floor per w/c with RN and . AAO x3. Resp even and unlabored room air. No distress noted. Tolerating Po fluids without c/o nausea/vomiting. All personal belongings returned to pt.

## 2023-05-23 NOTE — DISCHARGE SUMMARY
[facility] Surgery  Discharge Note  Short Stay    Procedure(s) (LRB):  COLONOSCOPY (N/A)      OUTCOME: Patient tolerated treatment/procedure well without complication and is now ready for discharge.    DISPOSITION: Home or Self Care    FINAL DIAGNOSIS:  Hematochezia    FOLLOWUP: In clinic    DISCHARGE INSTRUCTIONS:    Discharge Procedure Orders   Diet general     Call MD for:  temperature >100.4     Call MD for:  persistent nausea and vomiting     Call MD for:  severe uncontrolled pain     Call MD for:  difficulty breathing, headache or visual disturbances     Call MD for:  redness, tenderness, or signs of infection (pain, swelling, redness, odor or green/yellow discharge around incision site)     Call MD for:  persistent dizziness or light-headedness         Clinical Reference Documents Added to Patient Instructions         Document    COLONOSCOPY DISCHARGE INSTRUCTIONS (ENGLISH)            TIME SPENT ON DISCHARGE:    minutes

## 2023-05-23 NOTE — ANESTHESIA PREPROCEDURE EVALUATION
05/23/2023  Davina Stinson is a 53 y.o., female.      Pre-op Assessment    I have reviewed the Patient Summary Reports.     I have reviewed the Nursing Notes. I have reviewed the NPO Status.   I have reviewed the Medications.     Review of Systems  Social:  Non-Smoker    Cardiovascular:   Hypertension    Pulmonary:  Pulmonary Normal    Psych:   Psychiatric History anxiety          Physical Exam    Airway:  Mallampati: II   Mouth Opening: Normal  TM Distance: Normal  Tongue: Normal  Neck ROM: Normal ROM    Chest/Lungs:  Clear to auscultation    Heart:  Rate: Normal  Rhythm: Regular Rhythm        Anesthesia Plan  Type of Anesthesia, risks & benefits discussed:    Anesthesia Type: Gen Natural Airway  Intra-op Monitoring Plan: Standard ASA Monitors  Post Op Pain Control Plan: multimodal analgesia  Induction:  IV  ASA Score: 2  Day of Surgery Review of History & Physical: H&P Update referred to the surgeon/provider.    Ready For Surgery From Anesthesia Perspective.     .

## 2023-05-23 NOTE — PROVATION PATIENT INSTRUCTIONS
Discharge Summary/Instructions after an Endoscopic Procedure  Patient Name: Davina Stinson  Patient MRN: [id number]  Patient YOB: 1970  Tuesday, May 23, 2023  Jeanette Hooper MD  RESTRICTIONS:  During your procedure today, you received medications for sedation.  These   medications may affect your judgment, balance and coordination.  Therefore,   for 24 hours, you have the following restrictions:   - DO NOT drive a car, operate machinery, make legal/financial decisions,   sign important papers or drink alcohol.    ACTIVITY:  Today: no heavy lifting, straining or running due to procedural   sedation/anesthesia.  The following day: return to full activity including work.  DIET:  Eat and drink normally unless instructed otherwise.     TREATMENT FOR COMMON SIDE EFFECTS:  - Mild abdominal pain, nausea, belching, bloating or excessive gas:  rest,   eat lightly and use a heating pad.  - Sore Throat: treat with throat lozenges and/or gargle with warm salt   water.  - Because air was used during the procedure, expelling large amounts of air   from your rectum or belching is normal.  - If a bowel prep was taken, you may not have a bowel movement for 1-3 days.    This is normal.  SYMPTOMS TO WATCH FOR AND REPORT TO YOUR PHYSICIAN:  1. Abdominal pain or bloating, other than gas cramps.  2. Chest pain.  3. Back pain.  4. Signs of infection such as: chills or fever occurring within 24 hours   after the procedure.  5. Rectal bleeding, which would show as bright red, maroon, or black stools.   (A tablespoon of blood from the rectum is not serious, especially if   hemorrhoids are present.)  6. Vomiting.  7. Weakness or dizziness.  GO DIRECTLY TO THE NEAREST EMERGENCY ROOM IF YOU HAVE ANY OF THE FOLLOWING:      Difficulty breathing              Chills and/or fever over 101 F   Persistent vomiting and/or vomiting blood   Severe abdominal pain   Severe chest pain   Black, tarry stools   Bleeding- more than one tablespoon   Any  other symptom or condition that you feel may need urgent attention  Your doctor recommends these additional instructions:  If any biopsies were taken, your doctors clinic will contact you in 1 to 2   weeks with any results.  - Discharge patient to home.   - Resume previous diet.   - Continue present medications.   - Repeat colonoscopy in 10 years for screening purposes.  For questions, problems or results please call your physician - Jeanette Hooper MD at Work:  [phone].  [facility] EMERGENCY ROOM PHONE NUMBER: [phone]  IF A COMPLICATION OR EMERGENCY SITUATION ARISES AND YOU ARE UNABLE TO REACH   YOUR PHYSICIAN - GO DIRECTLY TO THE EMERGENCY ROOM.  MD Jeanette Crawford MD  5/23/2023 8:46:09 AM  This report has been verified and signed electronically.  Dear patient,  As a result of recent federal legislation (The Federal Cures Act), you may   receive lab or pathology results from your procedure in your [facility]   account before your physician is able to contact you. Your physician or   their representative will relay the results to you with their   recommendations at their soonest availability.  Thank you,  PROVATION

## 2023-05-23 NOTE — ANESTHESIA POSTPROCEDURE EVALUATION
Anesthesia Post Evaluation    Patient: Davina Stinson    Procedure(s) Performed: Procedure(s) (LRB):  COLONOSCOPY (N/A)    Final Anesthesia Type: general      Patient location during evaluation: PACU  Patient participation: Yes- Able to Participate  Level of consciousness: awake and alert  Post-procedure vital signs: reviewed and stable  Pain management: adequate  Airway patency: patent    PONV status at discharge: No PONV  Anesthetic complications: no      Cardiovascular status: blood pressure returned to baseline  Respiratory status: unassisted  Hydration status: euvolemic  Follow-up not needed.          Vitals Value Taken Time   /83 05/23/23 0918   Temp 36.7 °C (98 °F) 05/23/23 0840   Pulse 49 05/23/23 0918   Resp 30 05/23/23 0918   SpO2 98 % 05/23/23 0916   Vitals shown include unvalidated device data.      Event Time   Out of Recovery 09:20:00         Pain/Anshul Score: Anshul Score: 10 (5/23/2023  9:10 AM)  Modified Anshul Score: 19 (5/23/2023  9:30 AM)

## 2023-08-01 ENCOUNTER — PATIENT OUTREACH (OUTPATIENT)
Dept: ADMINISTRATIVE | Facility: HOSPITAL | Age: 53
End: 2023-08-01

## 2023-08-14 ENCOUNTER — OFFICE VISIT (OUTPATIENT)
Dept: FAMILY MEDICINE | Facility: CLINIC | Age: 53
End: 2023-08-14

## 2023-08-14 VITALS
HEART RATE: 69 BPM | OXYGEN SATURATION: 99 % | BODY MASS INDEX: 33.12 KG/M2 | DIASTOLIC BLOOD PRESSURE: 72 MMHG | SYSTOLIC BLOOD PRESSURE: 108 MMHG | HEIGHT: 67 IN | WEIGHT: 211 LBS | RESPIRATION RATE: 17 BRPM

## 2023-08-14 DIAGNOSIS — F41.1 GAD (GENERALIZED ANXIETY DISORDER): Primary | ICD-10-CM

## 2023-08-14 DIAGNOSIS — F51.01 PRIMARY INSOMNIA: ICD-10-CM

## 2023-08-14 DIAGNOSIS — F33.1 MODERATE EPISODE OF RECURRENT MAJOR DEPRESSIVE DISORDER: ICD-10-CM

## 2023-08-14 DIAGNOSIS — Z79.899 BENZODIAZEPINE CONTRACT EXISTS: ICD-10-CM

## 2023-08-14 PROCEDURE — 99999 PR PBB SHADOW E&M-EST. PATIENT-LVL IV: CPT | Mod: PBBFAC,,, | Performed by: NURSE PRACTITIONER

## 2023-08-14 PROCEDURE — 99999 PR PBB SHADOW E&M-EST. PATIENT-LVL IV: ICD-10-PCS | Mod: PBBFAC,,, | Performed by: NURSE PRACTITIONER

## 2023-08-14 PROCEDURE — 99214 OFFICE O/P EST MOD 30 MIN: CPT | Mod: PBBFAC | Performed by: NURSE PRACTITIONER

## 2023-08-14 PROCEDURE — 99214 OFFICE O/P EST MOD 30 MIN: CPT | Mod: S$PBB,,, | Performed by: NURSE PRACTITIONER

## 2023-08-14 PROCEDURE — 99214 PR OFFICE/OUTPT VISIT, EST, LEVL IV, 30-39 MIN: ICD-10-PCS | Mod: S$PBB,,, | Performed by: NURSE PRACTITIONER

## 2023-08-14 RX ORDER — ALPRAZOLAM 0.25 MG/1
0.25 TABLET ORAL DAILY PRN
Qty: 30 TABLET | Refills: 2 | Status: SHIPPED | OUTPATIENT
Start: 2023-08-14 | End: 2023-11-14

## 2023-08-14 RX ORDER — AMITRIPTYLINE HYDROCHLORIDE 50 MG/1
50 TABLET, FILM COATED ORAL NIGHTLY
Qty: 90 TABLET | Refills: 3 | Status: SHIPPED | OUTPATIENT
Start: 2023-08-14

## 2023-08-22 DIAGNOSIS — G89.4 CHRONIC PAIN SYNDROME: ICD-10-CM

## 2023-08-28 RX ORDER — GABAPENTIN 800 MG/1
800 TABLET ORAL 3 TIMES DAILY
Qty: 270 TABLET | Refills: 1 | Status: SHIPPED | OUTPATIENT
Start: 2023-08-28 | End: 2023-12-28 | Stop reason: SDUPTHER

## 2023-10-10 ENCOUNTER — HOSPITAL ENCOUNTER (OUTPATIENT)
Dept: RADIOLOGY | Facility: HOSPITAL | Age: 53
Discharge: HOME | End: 2023-10-10
Attending: NURSE PRACTITIONER
Payer: COMMERCIAL

## 2023-10-10 DIAGNOSIS — Z12.31 OTHER SCREENING MAMMOGRAM: ICD-10-CM

## 2023-10-13 ENCOUNTER — HOSPITAL ENCOUNTER (OUTPATIENT)
Dept: RADIOLOGY | Facility: HOSPITAL | Age: 53
Discharge: HOME | End: 2023-10-13
Attending: NURSE PRACTITIONER
Payer: COMMERCIAL

## 2023-10-13 DIAGNOSIS — N63.10 LUMP OF RIGHT BREAST: ICD-10-CM

## 2023-10-13 PROCEDURE — 76642 ULTRASOUND BREAST LIMITED: CPT | Mod: TC,RT

## 2023-10-13 PROCEDURE — 77066 DX MAMMO INCL CAD BI: CPT | Mod: 26,,, | Performed by: RADIOLOGY

## 2023-10-13 PROCEDURE — 77062 MAMMO DIGITAL DIAGNOSTIC BILAT WITH TOMO: ICD-10-PCS | Mod: 26,,, | Performed by: RADIOLOGY

## 2023-10-13 PROCEDURE — 77066 MAMMO DIGITAL DIAGNOSTIC BILAT WITH TOMO: ICD-10-PCS | Mod: 26,,, | Performed by: RADIOLOGY

## 2023-10-13 PROCEDURE — 77062 BREAST TOMOSYNTHESIS BI: CPT | Mod: 26,,, | Performed by: RADIOLOGY

## 2023-10-13 PROCEDURE — 77062 BREAST TOMOSYNTHESIS BI: CPT | Mod: TC

## 2023-10-13 PROCEDURE — 76642 US BREAST RIGHT LIMITED: ICD-10-PCS | Mod: 26,RT,, | Performed by: RADIOLOGY

## 2023-10-13 PROCEDURE — 76642 ULTRASOUND BREAST LIMITED: CPT | Mod: 26,RT,, | Performed by: RADIOLOGY

## 2023-10-19 ENCOUNTER — PATIENT MESSAGE (OUTPATIENT)
Dept: FAMILY MEDICINE | Facility: CLINIC | Age: 53
End: 2023-10-19
Payer: COMMERCIAL

## 2023-10-19 DIAGNOSIS — R92.8 ABNORMAL MAMMOGRAM OF RIGHT BREAST: Primary | ICD-10-CM

## 2023-11-14 ENCOUNTER — OFFICE VISIT (OUTPATIENT)
Dept: FAMILY MEDICINE | Facility: CLINIC | Age: 53
End: 2023-11-14
Payer: COMMERCIAL

## 2023-11-14 DIAGNOSIS — R73.03 PRE-DIABETES: ICD-10-CM

## 2023-11-14 DIAGNOSIS — F41.1 GAD (GENERALIZED ANXIETY DISORDER): Primary | ICD-10-CM

## 2023-11-14 DIAGNOSIS — N18.32 STAGE 3B CHRONIC KIDNEY DISEASE: ICD-10-CM

## 2023-11-14 PROCEDURE — 99213 OFFICE O/P EST LOW 20 MIN: CPT | Mod: 95,,, | Performed by: NURSE PRACTITIONER

## 2023-11-14 PROCEDURE — 99213 PR OFFICE/OUTPT VISIT, EST, LEVL III, 20-29 MIN: ICD-10-PCS | Mod: 95,,, | Performed by: NURSE PRACTITIONER

## 2023-11-14 RX ORDER — PAROXETINE 30 MG/1
30 TABLET, FILM COATED ORAL EVERY MORNING
Qty: 90 TABLET | Refills: 3 | Status: SHIPPED | OUTPATIENT
Start: 2023-11-14 | End: 2024-11-13

## 2023-11-14 NOTE — PROGRESS NOTES
Subjective:       Patient ID: Davina Pradhan is a 53 y.o. female.    Chief Complaint: No chief complaint on file.  ..The patient location is: [place]  The chief complaint leading to consultation is:  Refills    Visit type: audiovisual    Face to Face time with patient: 15 minutes of total time spent on the encounter, which includes face to face time and non-face to face time preparing to see the patient (eg, review of tests), Obtaining and/or reviewing separately obtained history, Documenting clinical information in the electronic or other health record, Independently interpreting results (not separately reported) and communicating results to the patient/family/caregiver, or Care coordination (not separately reported).     Each patient to whom he or she provides medical services by telemedicine is:  (1) informed of the relationship between the physician and patient and the respective role of any other health care provider with respect to management of the patient; and (2) notified that he or she may decline to receive medical services by telemedicine and may withdraw from such care at any time.    Answers submitted by the patient for this visit:  Review of Systems Questionnaire (Submitted on 11/14/2023)  activity change: No  unexpected weight change: No  neck pain: No  hearing loss: No  rhinorrhea: No  trouble swallowing: No  eye discharge: No  visual disturbance: No  chest tightness: No  wheezing: No  chest pain: No  palpitations: No  blood in stool: No  constipation: No  vomiting: No  diarrhea: No  polydipsia: No  polyuria: No  difficulty urinating: No  hematuria: No  menstrual problem: No  dysuria: No  joint swelling: No  arthralgias: No  headaches: No  weakness: No  confusion: No  dysphoric mood: No    Notes:   -  The pt is a 54 y/o female that presents for refill. H/o ANKIT taking paxil and prn use of xanax yet discussed per  increase use in xanax thus agreeable to inc paxil and dc xanax.    GFR 5/2023 38 and A1c  6.1% Reports now drinking 60 oz of water a day yet only urinates twice. Stopped all soda's and lost 23 lbs.  Pre-diabetes thus more weight loss encouraged.Discussed may need nephrology eval d/t CKD et due for labs.  -    Past Medical History:   Diagnosis Date    Anxiety     Depression     DVT (deep venous thrombosis) 2016    Right - unknown cause    Hypertension        Past Surgical History:   Procedure Laterality Date    COLONOSCOPY N/A 2023    Procedure: COLONOSCOPY;  Surgeon: Jeanette Hooper MD;  Location: [facility];  Service: General;  Laterality: N/A;    HYSTERECTOMY      PARTIAL HYSTERECTOMY      REDUCTION OF BOTH BREASTS          Social History     Socioeconomic History    Marital status:     Number of children: 1    Highest education level: Some college, no degree   Occupational History    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    Occupation: Works for [employer] 2023   Tobacco Use    Smoking status: Never    Smokeless tobacco: Never   Substance and Sexual Activity    Alcohol use: Not Currently    Drug use: Not Currently    Sexual activity: Yes     Partners: Male       Family History   Problem Relation Age of Onset    Hypertension Mother     Heart disease Mother         afib    Stroke Mother     Lung cancer Mother     Heart disease Father          from a staff infection in the hospital    Diabetes Maternal Aunt     Diabetes Maternal Uncle     Breast cancer Paternal Aunt     Diabetes Brother     Hypertension Brother     Hypertension Brother     Hypertension Brother     Diabetes Brother     Breast cancer Other        Review of patient's allergies indicates:   Allergen Reactions    Klonopin [clonazepam] Other (See Comments)      Suicidal ideations after 1 week of taking qhs    Sulfa (sulfonamide antibiotics)           Current Outpatient Medications:     amitriptyline (ELAVIL) 50 MG tablet, Take 1 tablet (50 mg total) by mouth every evening.,  Disp: 90 tablet, Rfl: 3    amLODIPine (NORVASC) 5 MG tablet, Take 1 tablet (5 mg total) by mouth once daily., Disp: 90 tablet, Rfl: 3    gabapentin (NEURONTIN) 800 MG tablet, TAKE 1 TABLET(800 MG) BY MOUTH THREE TIMES DAILY, Disp: 270 tablet, Rfl: 1    hydroCHLOROthiazide (HYDRODIURIL) 25 MG tablet, TAKE 1 TABLET(25 MG) BY MOUTH EVERY DAY, Disp: 90 tablet, Rfl: 3    losartan (COZAAR) 100 MG tablet, TAKE 1 TABLET(100 MG) BY MOUTH EVERY DAY, Disp: 90 tablet, Rfl: 3    paroxetine (PAXIL) 30 MG tablet, Take 1 tablet (30 mg total) by mouth every morning., Disp: 90 tablet, Rfl: 3    HPI  Review of Systems   Constitutional: Negative.  Negative for activity change and unexpected weight change.   HENT: Negative.  Negative for hearing loss, rhinorrhea and trouble swallowing.    Eyes: Negative.  Negative for discharge and visual disturbance.   Respiratory: Negative.  Negative for chest tightness and wheezing.    Cardiovascular: Negative.  Negative for chest pain and palpitations.   Gastrointestinal: Negative.  Negative for blood in stool, constipation, diarrhea and vomiting.   Endocrine: Negative.  Negative for polydipsia and polyuria.   Genitourinary: Negative.  Negative for difficulty urinating, dysuria, hematuria and menstrual problem.   Musculoskeletal: Negative.  Negative for arthralgias, joint swelling and neck pain.   Skin: Negative.    Allergic/Immunologic: Negative.    Neurological: Negative.  Negative for weakness and headaches.   Hematological: Negative.    Psychiatric/Behavioral:  Negative for confusion and dysphoric mood. The patient is nervous/anxious.        Objective:      Physical Exam  Constitutional:       General: She is not in acute distress.     Appearance: Normal appearance. She is obese. She is not ill-appearing.   HENT:      Head: Normocephalic.   Eyes:      Conjunctiva/sclera: Conjunctivae normal.   Pulmonary:      Effort: Pulmonary effort is normal.   Musculoskeletal:         General: Normal range of  motion.      Cervical back: Normal range of motion.   Skin:     Coloration: Skin is not jaundiced or pale.   Neurological:      Mental Status: She is alert and oriented to person, place, and time.   Psychiatric:         Mood and Affect: Mood normal.         Behavior: Behavior normal.         Thought Content: Thought content normal.         Judgment: Judgment normal.         Assessment:       1. ANKIT (generalized anxiety disorder)    2. Stage 3b chronic kidney disease    3. Pre-diabetes        Plan:     1- pt to self schedule mammo and US right breast 1/10/24  2- inc paxil to 30 mg  3- d/c xanax  4- NF labs and microalbumin on Fri, if GFR still low will refer to nephrology in [place].  5- RTC PRN  -    ANKIT (generalized anxiety disorder)  -     paroxetine (PAXIL) 30 MG tablet; Take 1 tablet (30 mg total) by mouth every morning.  Dispense: 90 tablet; Refill: 3    Stage 3b chronic kidney disease  -     Hemoglobin A1C; Future; Expected date: 11/14/2023  -     Comprehensive Metabolic Panel; Future; Expected date: 11/14/2023  -     CBC Auto Differential; Future; Expected date: 11/14/2023  -     Microalbumin/Creatinine Ratio, Urine; Future; Expected date: 11/15/2023    Pre-diabetes  -     Hemoglobin A1C; Future; Expected date: 11/14/2023  -     Comprehensive Metabolic Panel; Future; Expected date: 11/14/2023  -     CBC Auto Differential; Future; Expected date: 11/14/2023  -     Microalbumin/Creatinine Ratio, Urine; Future; Expected date: 11/15/2023        Risks, benefits, and side effects were discussed with the patient. All questions were answered to the fullest satisfaction of the patient, and pt verbalized understanding and agreement to treatment plan. Pt was to call with any new or worsening symptoms, or present to the ER.

## 2023-12-01 ENCOUNTER — LAB VISIT (OUTPATIENT)
Dept: LAB | Facility: HOSPITAL | Age: 53
End: 2023-12-01
Attending: NURSE PRACTITIONER
Payer: COMMERCIAL

## 2023-12-01 DIAGNOSIS — R73.03 PRE-DIABETES: ICD-10-CM

## 2023-12-01 DIAGNOSIS — N18.32 STAGE 3B CHRONIC KIDNEY DISEASE: ICD-10-CM

## 2023-12-01 LAB
ALBUMIN/CREAT UR: NORMAL UG/MG (ref 0–30)
CREAT UR-MCNC: 64 MG/DL (ref 15–325)
MICROALBUMIN UR DL<=1MG/L-MCNC: <5 UG/ML

## 2023-12-01 PROCEDURE — 82570 ASSAY OF URINE CREATININE: CPT | Performed by: NURSE PRACTITIONER

## 2023-12-04 ENCOUNTER — TELEPHONE (OUTPATIENT)
Dept: FAMILY MEDICINE | Facility: CLINIC | Age: 53
End: 2023-12-04
Payer: COMMERCIAL

## 2023-12-28 DIAGNOSIS — G89.4 CHRONIC PAIN SYNDROME: ICD-10-CM

## 2024-01-02 RX ORDER — GABAPENTIN 800 MG/1
800 TABLET ORAL 2 TIMES DAILY
Qty: 180 TABLET | Refills: 1 | Status: SHIPPED | OUTPATIENT
Start: 2024-01-02

## 2024-03-22 ENCOUNTER — OFFICE VISIT (OUTPATIENT)
Dept: FAMILY MEDICINE | Facility: CLINIC | Age: 54
End: 2024-03-22
Payer: COMMERCIAL

## 2024-03-22 VITALS
SYSTOLIC BLOOD PRESSURE: 136 MMHG | BODY MASS INDEX: 33.27 KG/M2 | HEIGHT: 67 IN | OXYGEN SATURATION: 98 % | HEART RATE: 61 BPM | DIASTOLIC BLOOD PRESSURE: 80 MMHG | RESPIRATION RATE: 18 BRPM | WEIGHT: 212 LBS

## 2024-03-22 DIAGNOSIS — I10 ESSENTIAL HYPERTENSION: ICD-10-CM

## 2024-03-22 DIAGNOSIS — M54.50 LUMBAR PAIN: ICD-10-CM

## 2024-03-22 DIAGNOSIS — M25.552 LEFT HIP PAIN: Primary | ICD-10-CM

## 2024-03-22 PROCEDURE — 99214 OFFICE O/P EST MOD 30 MIN: CPT | Mod: S$GLB,,, | Performed by: NURSE PRACTITIONER

## 2024-03-22 PROCEDURE — 99999 PR PBB SHADOW E&M-EST. PATIENT-LVL IV: CPT | Mod: PBBFAC,,, | Performed by: NURSE PRACTITIONER

## 2024-03-22 RX ORDER — AMLODIPINE BESYLATE 5 MG/1
5 TABLET ORAL DAILY
Qty: 90 TABLET | Refills: 3
Start: 2024-03-22

## 2024-03-22 NOTE — PROGRESS NOTES
Subjective:       Patient ID: Davina Pradhan is a 54 y.o. female.    Chief Complaint: Low-back Pain (X past month has been really bad) and Hip Pain (L hip x pain over the past month much worst)    The pt is a 53 y/o female that presents with c/o Left thip x 3 months feels like it is out of socket     Can't get less than 200 lb. Eating 1200 pavan reduced to 800 and still no weight loss.      Self stopped norvasc thus edu < 130/80 and recommend to resume.  -  Past Medical History:   Diagnosis Date    Anxiety     Depression     DVT (deep venous thrombosis) 2016    Right - unknown cause    Hypertension        Past Surgical History:   Procedure Laterality Date    COLONOSCOPY N/A 2023    Procedure: COLONOSCOPY;  Surgeon: Jeanette Hooper MD;  Location: [facility];  Service: General;  Laterality: N/A;    HYSTERECTOMY      PARTIAL HYSTERECTOMY      REDUCTION OF BOTH BREASTS          Social History     Socioeconomic History    Marital status:     Number of children: 1    Highest education level: Some college, no degree   Occupational History    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    Occupation: Works for [employer] 2023   Tobacco Use    Smoking status: Never    Smokeless tobacco: Never   Substance and Sexual Activity    Alcohol use: Not Currently    Drug use: Not Currently    Sexual activity: Yes     Partners: Male       Family History   Problem Relation Age of Onset    Hypertension Mother     Heart disease Mother         afib    Stroke Mother     Lung cancer Mother     Heart disease Father          from a staff infection in the hospital    Diabetes Maternal Aunt     Diabetes Maternal Uncle     Breast cancer Paternal Aunt     Diabetes Brother     Hypertension Brother     Hypertension Brother     Hypertension Brother     Diabetes Brother     Breast cancer Other        Review of patient's allergies indicates:   Allergen Reactions    Klonopin [clonazepam]  Other (See Comments)      Suicidal ideations after 1 week of taking qhs    Sulfa (sulfonamide antibiotics)           Current Outpatient Medications:     amitriptyline (ELAVIL) 50 MG tablet, Take 1 tablet (50 mg total) by mouth every evening., Disp: 90 tablet, Rfl: 3    gabapentin (NEURONTIN) 800 MG tablet, Take 1 tablet (800 mg total) by mouth 2 (two) times daily., Disp: 180 tablet, Rfl: 1    hydroCHLOROthiazide (HYDRODIURIL) 25 MG tablet, TAKE 1 TABLET(25 MG) BY MOUTH EVERY DAY, Disp: 90 tablet, Rfl: 3    losartan (COZAAR) 100 MG tablet, TAKE 1 TABLET(100 MG) BY MOUTH EVERY DAY, Disp: 90 tablet, Rfl: 3    paroxetine (PAXIL) 30 MG tablet, Take 1 tablet (30 mg total) by mouth every morning., Disp: 90 tablet, Rfl: 3    amLODIPine (NORVASC) 5 MG tablet, Take 1 tablet (5 mg total) by mouth once daily., Disp: 90 tablet, Rfl: 3    HPI  Review of Systems   Constitutional: Negative.    HENT: Negative.     Eyes: Negative.    Respiratory: Negative.     Cardiovascular: Negative.    Gastrointestinal: Negative.    Endocrine: Negative.    Genitourinary: Negative.    Musculoskeletal:         Left hip pain   Skin: Negative.    Allergic/Immunologic: Negative.    Neurological: Negative.    Hematological: Negative.    Psychiatric/Behavioral: Negative.         Objective:      Physical Exam  Vitals and nursing note reviewed.   Constitutional:       General: She is not in acute distress.     Appearance: Normal appearance. She is well-developed. She is obese. She is not ill-appearing.   HENT:      Head: Normocephalic.   Eyes:      Conjunctiva/sclera: Conjunctivae normal.   Neck:      Thyroid: No thyromegaly.   Cardiovascular:      Rate and Rhythm: Normal rate and regular rhythm.      Heart sounds: Normal heart sounds. No murmur heard.  Pulmonary:      Effort: Pulmonary effort is normal.      Breath sounds: Normal breath sounds. No wheezing or rales.   Musculoskeletal:         General: Normal range of motion.      Cervical back: Normal  range of motion.      Right lower leg: No edema.      Left lower leg: No edema.      Comments: Hip pain is not reproducible, tender at left SI    Skin:     General: Skin is warm and dry.   Neurological:      Mental Status: She is alert and oriented to person, place, and time. Mental status is at baseline.   Psychiatric:         Mood and Affect: Mood normal.         Behavior: Behavior normal.         Thought Content: Thought content normal.         Judgment: Judgment normal.         Assessment:       1. Left hip pain    2. Lumbar pain    3. Essential hypertension        Plan:     1- resume norvasc with goal < 130/80  2- xrays and labs next week  3- RTC 6 mo  -    Left hip pain  -     X-Ray Hip 2 or 3 views Left (with Pelvis when performed); Future; Expected date: 03/22/2024  -     X-Ray Lumbar Spine AP And Lateral; Future; Expected date: 03/22/2024    Lumbar pain  -     X-Ray Hip 2 or 3 views Left (with Pelvis when performed); Future; Expected date: 03/22/2024  -     X-Ray Lumbar Spine AP And Lateral; Future; Expected date: 03/22/2024    Essential hypertension  -     amLODIPine (NORVASC) 5 MG tablet; Take 1 tablet (5 mg total) by mouth once daily.  Dispense: 90 tablet; Refill: 3        Risks, benefits, and side effects were discussed with the patient. All questions were answered to the fullest satisfaction of the patient, and pt verbalized understanding and agreement to treatment plan. Pt was to call with any new or worsening symptoms, or present to the ER.

## 2024-04-03 ENCOUNTER — PATIENT MESSAGE (OUTPATIENT)
Dept: FAMILY MEDICINE | Facility: CLINIC | Age: 54
End: 2024-04-03
Payer: COMMERCIAL

## 2024-04-03 ENCOUNTER — HOSPITAL ENCOUNTER (OUTPATIENT)
Dept: RADIOLOGY | Facility: HOSPITAL | Age: 54
Discharge: HOME | End: 2024-04-03
Attending: NURSE PRACTITIONER
Payer: COMMERCIAL

## 2024-04-03 DIAGNOSIS — M25.552 LEFT HIP PAIN: ICD-10-CM

## 2024-04-03 DIAGNOSIS — M54.50 LUMBAR PAIN: ICD-10-CM

## 2024-04-03 PROCEDURE — 73502 X-RAY EXAM HIP UNI 2-3 VIEWS: CPT | Mod: TC,LT

## 2024-04-03 PROCEDURE — 73502 X-RAY EXAM HIP UNI 2-3 VIEWS: CPT | Mod: 26,LT,, | Performed by: RADIOLOGY

## 2024-04-03 PROCEDURE — 72100 X-RAY EXAM L-S SPINE 2/3 VWS: CPT | Mod: 26,,, | Performed by: RADIOLOGY

## 2024-04-03 PROCEDURE — 72100 X-RAY EXAM L-S SPINE 2/3 VWS: CPT | Mod: TC

## 2024-04-05 ENCOUNTER — TELEPHONE (OUTPATIENT)
Dept: FAMILY MEDICINE | Facility: CLINIC | Age: 54
End: 2024-04-05
Payer: COMMERCIAL

## 2024-04-05 DIAGNOSIS — M25.552 LEFT HIP PAIN: ICD-10-CM

## 2024-04-05 DIAGNOSIS — M54.50 LUMBAR PAIN: Primary | ICD-10-CM

## 2024-04-05 NOTE — TELEPHONE ENCOUNTER
----- Message from Anushka Alexander MA sent at 4/5/2024 11:47 AM CDT -----  I spoke to the patient regarding her test results. She stated that she want to try Physical Therapy. Please advise, thanks

## 2024-04-16 ENCOUNTER — CLINICAL SUPPORT (OUTPATIENT)
Dept: REHABILITATION | Facility: HOSPITAL | Age: 54
End: 2024-04-16
Payer: COMMERCIAL

## 2024-04-16 DIAGNOSIS — M54.50 LUMBAR PAIN: ICD-10-CM

## 2024-04-16 DIAGNOSIS — M25.552 LEFT HIP PAIN: ICD-10-CM

## 2024-04-16 DIAGNOSIS — Z74.09 IMPAIRED FUNCTIONAL MOBILITY, BALANCE, GAIT, AND ENDURANCE: Primary | ICD-10-CM

## 2024-04-16 PROCEDURE — 97161 PT EVAL LOW COMPLEX 20 MIN: CPT | Mod: PN

## 2024-04-16 PROCEDURE — 97110 THERAPEUTIC EXERCISES: CPT | Mod: PN

## 2024-04-16 NOTE — PLAN OF CARE
[facility]   Physical Therapy Initial Evaluation      Name: Davina Pradhan  Clinic Number: [id number]    Therapy Diagnosis:   Encounter Diagnoses   Name Primary?    Lumbar pain     Left hip pain     Impaired functional mobility, balance, gait, and endurance Yes        Physician: Tolu Timmons*    Physician Orders: PT Eval and Treat   Medical Diagnosis from Referral: M54.50 (ICD-10-CM) - Lumbar pain M25.552 (ICD-10-CM) - Left hip pain  Evaluation Date: 4/16/2024  Authorization Period Expiration: 12/31/24  Plan of Care Expiration: 7/16/24  Progress Note Due: 5/16/24  Date of Surgery: none  Visit # / Visits authorized: 1/ 1   FOTO: 1/ 3    Precautions: Standard and Fall     Time In: 350  Time Out: 430  Total Billable Time: 40 minutes    Subjective     Date of onset: 10 years     History of current condition - Davina reports: scoliosis severe had entire life, herniated disc in lumbar and cervical region, makes her short of breath. 10 years with pain that has progressively worsened.     Falls: none    Imaging: see medical chart for complete list     Prior Therapy: 20 years ago   Social History: lives with their spouse, 14 steps to go up and down   Occupation: owns a gas station, on feet often  Prior / current Level of Function: independent with increased pain      Pain:  Current 9/10, worst 10/10, best 6/10   Location: bilateral back    Description: Aching, Throbbing, Tight, Numb, Electric, and Shooting  Aggravating Factors: Standing, Bending, and Walking  Easing Factors: relaxation    Patients goals: ' to be 70% pain free. '      Medical History:   Past Medical History:   Diagnosis Date    Anxiety     Depression     DVT (deep venous thrombosis) 2016    Right - unknown cause    Hypertension        Surgical History:   Davina Pradhan  has a past surgical history that includes Partial hysterectomy (2007); Reduction of both breasts (2006); Colonoscopy (N/A, 05/23/2023); and  Hysterectomy.    Medications:   Davina has a current medication list which includes the following prescription(s): amitriptyline, amlodipine, gabapentin, hydrochlorothiazide, losartan, and paroxetine.    Allergies:   Review of patient's allergies indicates:   Allergen Reactions    Klonopin [clonazepam] Other (See Comments)      Suicidal ideations after 1 week of taking qhs    Sulfa (sulfonamide antibiotics)         Objective      Observation: alert and orient x 4    Posture:    -       Scoliosis    Gait: normal at this time.     Lumbar Range of Motion:    % of Normal Range Pain   Flexion 25%    Extension 75%    Left Side Bending 49%    Right Side Bending 40%       Lower Extremity Strength   Left Right   Knee extension: 4-/5 4-/5   Knee flexion: 4/5 4-/5   Hip flexion: 4/5 4/5   Hip extension:  3+/5 3+/5   Hip abduction: 3+/5 3+/5   Hip adduction: 3+/5 3+/5   Ankle dorsiflexion: 5/5 5/5   Ankle plantarflexion: 5/5 5/5       Joint Mobility:   Lumbar: CPA restricted,   RPA restricted  LPA restricted    Thoracic: restricted    Palpation: moderate tenderness to palpation at lumbar region.    Sensation: Normal at this time    Flexibility:     90/90 SLR = R minimal restriction, L minimal restriction   Denny test: R minimal restriction, L minimal restriction    PT Evaluation Completed: Yes  Discussed Plan of Care with patient: Yes    Intake Outcome Measure for FOTO Oswestry Survey    Therapist reviewed FOTO scores for Davina Pradhan on 4/16/2024.   FOTO report - see Media section or FOTO account episode details.    Intake Score: 52%         Treatment     Total Treatment time (time-based codes) separate from Evaluation: 30 minutes     Davina received the treatments listed below:      therapeutic exercises to develop strength and endurance for 10 minutes including:  Hip 3 way 20x  Mini squats 20x     Patient Education and Home Exercises     Education provided:   - HEP     Written Home Exercises Provided: Patient instructed to  cont prior HEP. Exercises were reviewed and Davina was able to demonstrate them prior to the end of the session.  Davina demonstrated good  understanding of the education provided. See EMR under Patient Instructions for exercises provided during therapy sessions.    Assessment     Davina is a 54 y.o. female referred to outpatient Physical Therapy with a medical diagnosis of lumbar and left hip pain. Patient presents with decreased stride and step length. Pt has tight and tender lumbar region due to increased scoliosis. Pt has left side rib hump with left side pelvis elevated. Pt is in need of strengthening and stretching at this time to prevent further decline in functional status.      Patient prognosis is Good.   Patient will benefit from skilled outpatient Physical Therapy to address the deficits stated above and in the chart below, provide patient /family education, and to maximize patientt's level of independence.     Plan of care discussed with patient: Yes  Patient's spiritual, cultural and educational needs considered and patient is agreeable to the plan of care and goals as stated below:     Anticipated Barriers for therapy: NA    Medical Necessity is demonstrated by the following  History  Co-morbidities and personal factors that may impact the plan of care [x] LOW: no personal factors / co-morbidities  [] MODERATE: 1-2 personal factors / co-morbidities  [] HIGH: 3+ personal factors / co-morbidities    Moderate / High Support Documentation:   Co-morbidities affecting plan of care:     Personal Factors:   no deficits     Examination  Body Structures and Functions, activity limitations and participation restrictions that may impact the plan of care [x] LOW: addressing 1-2 elements  [] MODERATE: 3+ elements  [] HIGH: 4+ elements (please support below)    Moderate / High Support Documentation:      Clinical Presentation [x] LOW: stable  [] MODERATE: Evolving  [] HIGH: Unstable     Decision Making/ Complexity  Score: low       Goals:  Short Term Goals: 3 weeks   Pt will be compliant with HEP.  Pt will improve quad strength by 1/2 grade to allow for strength to go up and down stairs.   Pt will improve sit <>  30 sec to at least 10 allow for increased functional mobility.    Long Term Goals: 6 weeks   Pt will be independent with HEP to allow for increased functional tasks.  Pt will improve FOTO by 10 % to demonstrate improvement in quality of life.  Pt will improve hip flexor strength by 1/2 grade to allow for normalized body mechanics.   Plan     Plan of care Certification: 4/16/2024 to 7/16/24.    Outpatient Physical Therapy 2 times weekly for 6 weeks to include the following interventions: Aquatic Therapy, Gait Training, Manual Therapy, Moist Heat/ Ice, Neuromuscular Re-ed, Patient Education, Therapeutic Activities, Therapeutic Exercise, and Ultrasound.     Omaira Romero, PT        Physician's Signature: _________________________________________ Date: ________________

## 2024-04-18 ENCOUNTER — CLINICAL SUPPORT (OUTPATIENT)
Dept: REHABILITATION | Facility: HOSPITAL | Age: 54
End: 2024-04-18
Payer: COMMERCIAL

## 2024-04-18 DIAGNOSIS — Z74.09 IMPAIRED FUNCTIONAL MOBILITY, BALANCE, GAIT, AND ENDURANCE: Primary | ICD-10-CM

## 2024-04-18 PROCEDURE — 97110 THERAPEUTIC EXERCISES: CPT | Mod: PN

## 2024-04-18 PROCEDURE — 97140 MANUAL THERAPY 1/> REGIONS: CPT | Mod: PN

## 2024-04-18 PROCEDURE — 97012 MECHANICAL TRACTION THERAPY: CPT | Mod: PN

## 2024-04-18 NOTE — PLAN OF CARE
PT met face to face with Jose M Lockhart PTA to discuss patient's treatment plan and progress towards established goals.  Treatment will be continued as described in initial report/eval and progress notes.  Patient will be seen by physical therapist every sixth visit and minimally once per month.    Additional information: NA

## 2024-04-18 NOTE — PROGRESS NOTES
[facility]   Physical Therapy Treatment Note      Name: Davina Pradhan  Clinic Number: [id number]    Therapy Diagnosis:   Encounter Diagnosis   Name Primary?    Impaired functional mobility, balance, gait, and endurance Yes     Physician: Tolu Timmons*    Visit Date: 4/18/2024    Therapy Diagnosis:        Encounter Diagnoses   Name Primary?    Lumbar pain      Left hip pain      Impaired functional mobility, balance, gait, and endurance Yes        Physician: Tolu Timmons*     Physician Orders: PT Eval and Treat   Medical Diagnosis from Referral: M54.50 (ICD-10-CM) - Lumbar pain M25.552 (ICD-10-CM) - Left hip pain  Evaluation Date: 4/16/2024  Authorization Period Expiration: 12/31/24  Plan of Care Expiration: 7/16/24  Progress Note Due: 5/16/24  Date of Surgery: none  Visit # / Visits authorized: 1/ 20   FOTO: 1/ 3     Precautions: Standard and Fall      Time In: 350  Time Out: 430  Total Billable Time: 40 minutes    PTA Visit #: 0/5       Subjective     Patient reports: she felt better when she left from previous session.  She was compliant with home exercise program.  Response to previous treatment: good response 2  Functional change: none at this time.     Pain: 6/10  Location: bilateral back      Objective      Objective Measures updated at progress report unless specified.     Treatment     Davina received the treatments listed below:      therapeutic exercises to develop strength and endurance for 25 minutes including:  Nu step 15 minutes level  Hip 3 way  Bridges 20x   SLR with PPT     manual therapy techniques: Joint mobilizations, Manual traction, and Soft tissue Mobilization were applied to the:  15 minutes, including:  Cupping for 10 minutes     Traction- 9090 traction for 10 minutes       Patient Education and Home Exercises       Education provided:   - HEP    Written Home Exercises Provided: Patient instructed to cont prior HEP. Exercises were reviewed and  Davina was able to demonstrate them prior to the end of the session.  Davina demonstrated good  understanding of the education provided. See Electronic Medical Record under Patient Instructions for exercises provided during therapy sessions    Assessment     Davina tolerated treatment well, continue with PT to prevent further decline in functional status.     Davina Is progressing well towards her goals.   Patient prognosis is Good.     Patient will continue to benefit from skilled outpatient physical therapy to address the deficits listed in the problem list box on initial evaluation, provide pt/family education and to maximize pt's level of independence in the home and community environment.     Patient's spiritual, cultural and educational needs considered and pt agreeable to plan of care and goals.     Anticipated barriers to physical therapy: NA    Goals:  Short Term Goals: 3 weeks   Pt will be compliant with HEP.  Pt will improve quad strength by 1/2 grade to allow for strength to go up and down stairs.   Pt will improve sit <>  30 sec to at least 10 allow for increased functional mobility.     Long Term Goals: 6 weeks   Pt will be independent with HEP to allow for increased functional tasks.  Pt will improve FOTO by 10 % to demonstrate improvement in quality of life.  Pt will improve hip flexor strength by 1/2 grade to allow for normalized body mechanics.   Plan      Plan of care Certification: 4/16/2024 to 7/16/24.     Outpatient Physical Therapy 2 times weekly for 6 weeks to include the following interventions: Aquatic Therapy, Gait Training, Manual Therapy, Moist Heat/ Ice, Neuromuscular Re-ed, Patient Education, Therapeutic Activities, Therapeutic Exercise, and Ultrasound.   Omaira Romero, PT

## 2024-04-26 ENCOUNTER — CLINICAL SUPPORT (OUTPATIENT)
Dept: REHABILITATION | Facility: HOSPITAL | Age: 54
End: 2024-04-26
Payer: COMMERCIAL

## 2024-04-26 DIAGNOSIS — Z74.09 IMPAIRED FUNCTIONAL MOBILITY, BALANCE, GAIT, AND ENDURANCE: Primary | ICD-10-CM

## 2024-04-26 PROCEDURE — 97110 THERAPEUTIC EXERCISES: CPT | Mod: PN,CQ

## 2024-04-26 PROCEDURE — 97140 MANUAL THERAPY 1/> REGIONS: CPT | Mod: PN,CQ

## 2024-04-26 NOTE — PROGRESS NOTES
[facility]   Physical Therapy Treatment Note      Name: Davina Pradhan  Clinic Number: [id number]    Therapy Diagnosis:   Encounter Diagnosis   Name Primary?    Impaired functional mobility, balance, gait, and endurance Yes     Physician: Tolu Timmons*    Visit Date: 4/26/2024    Therapy Diagnosis:        Encounter Diagnoses   Name Primary?    Lumbar pain      Left hip pain      Impaired functional mobility, balance, gait, and endurance Yes        Physician: Tolu Timmons*     Physician Orders: PT Eval and Treat   Medical Diagnosis from Referral: M54.50 (ICD-10-CM) - Lumbar pain M25.552 (ICD-10-CM) - Left hip pain  Evaluation Date: 4/16/2024  Authorization Period Expiration: 12/31/24  Plan of Care Expiration: 7/16/24  Progress Note Due: 5/16/24  Date of Surgery: none  Visit # / Visits authorized: 2/ 20 + eval  FOTO: 1/ 3     Precautions: Standard and Fall      Time In: 425  Time Out: 505  Total Billable Time: 40 minutes    PTA Visit #: 1/5       Subjective     Patient reports: feeling better after the NuStep and cupping, so far.  She was compliant with home exercise program.  Response to previous treatment: good response   Functional change: none at this time.     Pain: 6/10  Location: bilateral back      Objective      Objective Measures updated at progress report unless specified.     Treatment     Davina received the treatments listed below:      therapeutic exercises to develop strength and endurance for 25 minutes including:  Nu step 15 minutes level  Hip 3 way x 10  Bridges 20x   SLR with PPT 2 x 10 each    manual therapy techniques: Joint mobilizations, Manual traction, and Soft tissue Mobilization were applied to the:  15 minutes, including:  Myofascial Release   Left piriformis   Left QL   Bilateral psoas      Cupping for 10 minutes     Traction- 9090 traction for 10 minutes       Patient Education and Home Exercises       Education provided:   - HEP    Written  Home Exercises Provided: Patient instructed to cont prior HEP. Exercises were reviewed and Davina was able to demonstrate them prior to the end of the session.  Davina demonstrated good  understanding of the education provided. See Electronic Medical Record under Patient Instructions for exercises provided during therapy sessions    Assessment     Davina tolerated treatment well, continue with PT to prevent further decline in functional status.     Davina Is progressing well towards her goals.   Patient prognosis is Good.     Patient will continue to benefit from skilled outpatient physical therapy to address the deficits listed in the problem list box on initial evaluation, provide pt/family education and to maximize pt's level of independence in the home and community environment.     Patient's spiritual, cultural and educational needs considered and pt agreeable to plan of care and goals.     Anticipated barriers to physical therapy: NA    Goals:  Short Term Goals: 3 weeks   Pt will be compliant with HEP.  Pt will improve quad strength by 1/2 grade to allow for strength to go up and down stairs.   Pt will improve sit <>  30 sec to at least 10 allow for increased functional mobility.     Long Term Goals: 6 weeks   Pt will be independent with HEP to allow for increased functional tasks.  Pt will improve FOTO by 10 % to demonstrate improvement in quality of life.  Pt will improve hip flexor strength by 1/2 grade to allow for normalized body mechanics.   Plan      Plan of care Certification: 4/16/2024 to 7/16/24.     Outpatient Physical Therapy 2 times weekly for 6 weeks to include the following interventions: Aquatic Therapy, Gait Training, Manual Therapy, Moist Heat/ Ice, Neuromuscular Re-ed, Patient Education, Therapeutic Activities, Therapeutic Exercise, and Ultrasound.   Jose M Lockhart, PTA

## 2024-05-01 ENCOUNTER — CLINICAL SUPPORT (OUTPATIENT)
Dept: REHABILITATION | Facility: HOSPITAL | Age: 54
End: 2024-05-01
Payer: COMMERCIAL

## 2024-05-01 DIAGNOSIS — Z74.09 IMPAIRED FUNCTIONAL MOBILITY, BALANCE, GAIT, AND ENDURANCE: Primary | ICD-10-CM

## 2024-05-01 PROCEDURE — 97110 THERAPEUTIC EXERCISES: CPT | Mod: PN,CQ

## 2024-05-01 PROCEDURE — 97140 MANUAL THERAPY 1/> REGIONS: CPT | Mod: PN,CQ

## 2024-05-01 NOTE — PROGRESS NOTES
[facility]   Physical Therapy Treatment Note      Name: Davina Pradhan  Clinic Number: [id number]    Therapy Diagnosis:   Encounter Diagnosis   Name Primary?    Impaired functional mobility, balance, gait, and endurance Yes     Physician: Tolu Timmons*    Visit Date: 5/1/2024    Therapy Diagnosis:        Encounter Diagnoses   Name Primary?    Lumbar pain      Left hip pain      Impaired functional mobility, balance, gait, and endurance Yes        Physician: Tolu Timmons*     Physician Orders: PT Eval and Treat   Medical Diagnosis from Referral: M54.50 (ICD-10-CM) - Lumbar pain M25.552 (ICD-10-CM) - Left hip pain  Evaluation Date: 4/16/2024  Authorization Period Expiration: 12/31/24  Plan of Care Expiration: 7/16/24  Progress Note Due: 5/16/24  Date of Surgery: none  Visit # / Visits authorized: 2/ 20 + eval  FOTO: 1/ 3     Precautions: Standard and Fall      Time In: 345  Time Out: 430  Total Billable Time: 40 minutes    PTA Visit #: 2/5       Subjective     Patient reports: being in pain today.  The releases last time helped some but today she is hurting.  She was compliant with home exercise program.  Response to previous treatment: good response   Functional change: none at this time.     Pain: 6/10  Location: bilateral back      Objective      Objective Measures updated at progress report unless specified.     Treatment     Davina received the treatments listed below:      therapeutic exercises to develop strength and endurance for 15 minutes including:  Nu step 15 minutes level  Hip 3 way x 10  Bridges 20x   SLR with PPT 2 x 10 each    manual therapy techniques: Joint mobilizations, Manual traction, and Soft tissue Mobilization were applied to the:  25 minutes, including:  Myofascial Release   Left piriformis   Multifidus/lats dorsi   Cranial sacral no lift      Cupping for 10 minutes     Traction- 9090 traction for 10 minutes       Patient Education and Home  Exercises       Education provided:   - HEP    Written Home Exercises Provided: Patient instructed to cont prior HEP. Exercises were reviewed and Davina was able to demonstrate them prior to the end of the session.  Davina demonstrated good  understanding of the education provided. See Electronic Medical Record under Patient Instructions for exercises provided during therapy sessions    Assessment     Davina responded well to the releases today.  Changed the release set and noted good glides on her lats and cranial sacral tension.    Davina Is progressing well towards her goals.   Patient prognosis is Good.     Patient will continue to benefit from skilled outpatient physical therapy to address the deficits listed in the problem list box on initial evaluation, provide pt/family education and to maximize pt's level of independence in the home and community environment.     Patient's spiritual, cultural and educational needs considered and pt agreeable to plan of care and goals.     Anticipated barriers to physical therapy: NA    Goals:  Short Term Goals: 3 weeks   Pt will be compliant with HEP.  Pt will improve quad strength by 1/2 grade to allow for strength to go up and down stairs.   Pt will improve sit <>  30 sec to at least 10 allow for increased functional mobility.     Long Term Goals: 6 weeks   Pt will be independent with HEP to allow for increased functional tasks.  Pt will improve FOTO by 10 % to demonstrate improvement in quality of life.  Pt will improve hip flexor strength by 1/2 grade to allow for normalized body mechanics.   Plan      Plan of care Certification: 4/16/2024 to 7/16/24.     Outpatient Physical Therapy 2 times weekly for 6 weeks to include the following interventions: Aquatic Therapy, Gait Training, Manual Therapy, Moist Heat/ Ice, Neuromuscular Re-ed, Patient Education, Therapeutic Activities, Therapeutic Exercise, and Ultrasound.   Jose M Lockhart, PTA

## 2024-05-03 ENCOUNTER — CLINICAL SUPPORT (OUTPATIENT)
Dept: REHABILITATION | Facility: HOSPITAL | Age: 54
End: 2024-05-03
Payer: COMMERCIAL

## 2024-05-03 DIAGNOSIS — Z74.09 IMPAIRED FUNCTIONAL MOBILITY, BALANCE, GAIT, AND ENDURANCE: Primary | ICD-10-CM

## 2024-05-03 PROCEDURE — 97140 MANUAL THERAPY 1/> REGIONS: CPT | Mod: PN,CQ

## 2024-05-03 PROCEDURE — 97110 THERAPEUTIC EXERCISES: CPT | Mod: PN,CQ

## 2024-05-03 NOTE — PROGRESS NOTES
[facility]   Physical Therapy Treatment Note      Name: Davina Pradhan  Clinic Number: [id number]    Therapy Diagnosis:   Encounter Diagnosis   Name Primary?    Impaired functional mobility, balance, gait, and endurance Yes     Physician: Tolu Timmons*    Visit Date: 5/3/2024    Therapy Diagnosis:        Encounter Diagnoses   Name Primary?    Lumbar pain      Left hip pain      Impaired functional mobility, balance, gait, and endurance Yes        Physician: Tolu Timmons*     Physician Orders: PT Eval and Treat   Medical Diagnosis from Referral: M54.50 (ICD-10-CM) - Lumbar pain M25.552 (ICD-10-CM) - Left hip pain  Evaluation Date: 4/16/2024  Authorization Period Expiration: 12/31/24  Plan of Care Expiration: 7/16/24  Progress Note Due: 5/16/24  Date of Surgery: none  Visit # / Visits authorized: 2/ 20 + eval  FOTO: 1/ 3     Precautions: Standard and Fall      Time In: 430  Time Out: 510  Total Billable Time: 40 minutes    PTA Visit #: 3/5       Subjective     Patient reports: feeling better after releases but tension builds at work and limits carry over.  She was compliant with home exercise program.  Response to previous treatment: good response   Functional change: none at this time.     Pain: 6/10  Location: bilateral back      Objective      Objective Measures updated at progress report unless specified.     Treatment     Davina received the treatments listed below:      therapeutic exercises to develop strength and endurance for 10 minutes including:  Nu step 10 minutes level  Hip 3 way x 10  Bridges 20x   SLR with PPT 2 x 10 each    manual therapy techniques: Joint mobilizations, Manual traction, and Soft tissue Mobilization were applied to the:  30 minutes, including:  Myofascial Release   Right piriformis   Left piriformis   Multifidus/lats dorsi   Sacrum rotations   Cranial sacral no lift      Cupping for 10 minutes     Traction- 9090 traction for 10 minutes        Patient Education and Home Exercises       Education provided:   - HEP    Written Home Exercises Provided: Patient instructed to cont prior HEP. Exercises were reviewed and Davina was able to demonstrate them prior to the end of the session.  Davina demonstrated good  understanding of the education provided. See Electronic Medical Record under Patient Instructions for exercises provided during therapy sessions    Assessment     Davina responded well to the releases today.  Changed the release set and noted good glides on her lats.    Davina Is progressing well towards her goals.   Patient prognosis is Good.     Patient will continue to benefit from skilled outpatient physical therapy to address the deficits listed in the problem list box on initial evaluation, provide pt/family education and to maximize pt's level of independence in the home and community environment.     Patient's spiritual, cultural and educational needs considered and pt agreeable to plan of care and goals.     Anticipated barriers to physical therapy: NA    Goals:  Short Term Goals: 3 weeks   Pt will be compliant with HEP.  Pt will improve quad strength by 1/2 grade to allow for strength to go up and down stairs.   Pt will improve sit <>  30 sec to at least 10 allow for increased functional mobility.     Long Term Goals: 6 weeks   Pt will be independent with HEP to allow for increased functional tasks.  Pt will improve FOTO by 10 % to demonstrate improvement in quality of life.  Pt will improve hip flexor strength by 1/2 grade to allow for normalized body mechanics.   Plan      Plan of care Certification: 4/16/2024 to 7/16/24.     Outpatient Physical Therapy 2 times weekly for 6 weeks to include the following interventions: Aquatic Therapy, Gait Training, Manual Therapy, Moist Heat/ Ice, Neuromuscular Re-ed, Patient Education, Therapeutic Activities, Therapeutic Exercise, and Ultrasound.   Jose M Lockhart, PTA

## 2024-05-08 ENCOUNTER — CLINICAL SUPPORT (OUTPATIENT)
Dept: REHABILITATION | Facility: HOSPITAL | Age: 54
End: 2024-05-08
Payer: COMMERCIAL

## 2024-05-08 DIAGNOSIS — Z74.09 IMPAIRED FUNCTIONAL MOBILITY, BALANCE, GAIT, AND ENDURANCE: Primary | ICD-10-CM

## 2024-05-08 PROCEDURE — 97140 MANUAL THERAPY 1/> REGIONS: CPT | Mod: PN

## 2024-05-08 PROCEDURE — 97110 THERAPEUTIC EXERCISES: CPT | Mod: PN

## 2024-05-08 NOTE — PROGRESS NOTES
[facility]   Physical Therapy Treatment Note      Name: Davina Pradhan  Clinic Number: [id number]    Therapy Diagnosis:   Encounter Diagnosis   Name Primary?    Impaired functional mobility, balance, gait, and endurance Yes     Physician: Tolu Timmons*    Visit Date: 5/8/2024    Therapy Diagnosis:        Encounter Diagnoses   Name Primary?    Lumbar pain      Left hip pain      Impaired functional mobility, balance, gait, and endurance Yes        Physician: Tolu Timmons*     Physician Orders: PT Eval and Treat   Medical Diagnosis from Referral: M54.50 (ICD-10-CM) - Lumbar pain M25.552 (ICD-10-CM) - Left hip pain  Evaluation Date: 4/16/2024  Authorization Period Expiration: 12/31/24  Plan of Care Expiration: 7/16/24  Progress Note Due: 5/16/24  Date of Surgery: none  Visit # / Visits authorized: 3/ 20 + eval  FOTO: 1/ 3     Precautions: Standard and Fall      Time In: 430  Time Out: 510  Total Billable Time: 40 minutes    PTA Visit #: 0/5       Subjective     Patient reports: having increased pain at this time, not feeling motivated at this time.   She was compliant with home exercise program.  Response to previous treatment: good response   Functional change: none at this time.     Pain: 6/10  Location: bilateral back      Objective      Objective Measures updated at progress report unless specified.     Treatment     Davina received the treatments listed below:      therapeutic exercises to develop strength and endurance for 15 minutes including:  Nu step 16 minutes level  Hip 3 way x 10  Bridges 20x   SLR with PPT 2 x 10 each    manual therapy techniques: Joint mobilizations, Manual traction, and Soft tissue Mobilization were applied to the:  30 minutes, including:  Myofascial Release   Right piriformis   Left piriformis   Multifidus/lats dorsi   Sacrum rotations   Cranial sacral no lift      Cupping for 20 minutes to back region from thoracic to lumbar region    Mobilizing to lumbar region      Traction- 9090 traction for 10 minutes       Patient Education and Home Exercises       Education provided:   - HEP    Written Home Exercises Provided: Patient instructed to cont prior HEP. Exercises were reviewed and Davina was able to demonstrate them prior to the end of the session.  Davina demonstrated good  understanding of the education provided. See Electronic Medical Record under Patient Instructions for exercises provided during therapy sessions    Assessment     Davina noted to have increased pain at this time. Pt was able to have some relief post cupping.     Davina Is progressing well towards her goals.   Patient prognosis is Good.     Patient will continue to benefit from skilled outpatient physical therapy to address the deficits listed in the problem list box on initial evaluation, provide pt/family education and to maximize pt's level of independence in the home and community environment.     Patient's spiritual, cultural and educational needs considered and pt agreeable to plan of care and goals.     Anticipated barriers to physical therapy: NA    Goals:  Short Term Goals: 3 weeks   Pt will be compliant with HEP.  Pt will improve quad strength by 1/2 grade to allow for strength to go up and down stairs.   Pt will improve sit <>  30 sec to at least 10 allow for increased functional mobility.     Long Term Goals: 6 weeks   Pt will be independent with HEP to allow for increased functional tasks.  Pt will improve FOTO by 10 % to demonstrate improvement in quality of life.  Pt will improve hip flexor strength by 1/2 grade to allow for normalized body mechanics.   Plan      Plan of care Certification: 4/16/2024 to 7/16/24.     Outpatient Physical Therapy 2 times weekly for 6 weeks to include the following interventions: Aquatic Therapy, Gait Training, Manual Therapy, Moist Heat/ Ice, Neuromuscular Re-ed, Patient Education, Therapeutic Activities, Therapeutic  Exercise, and Ultrasound.   Omaira Romero, PT

## 2024-06-04 DIAGNOSIS — I10 ESSENTIAL HYPERTENSION: ICD-10-CM

## 2024-06-04 RX ORDER — HYDROCHLOROTHIAZIDE 25 MG/1
TABLET ORAL
Qty: 90 TABLET | Refills: 3 | Status: SHIPPED | OUTPATIENT
Start: 2024-06-04

## 2024-06-04 RX ORDER — LOSARTAN POTASSIUM 100 MG/1
TABLET ORAL
Qty: 90 TABLET | Refills: 3 | Status: SHIPPED | OUTPATIENT
Start: 2024-06-04

## 2024-06-04 NOTE — TELEPHONE ENCOUNTER
Refill Routing Note   Medication(s) are not appropriate for processing by [facility] for the following reason(s):        Non-participating provider:     [facility] action(s):  Route      Medication Therapy Plan:         Appointments  past 12m or future 3m with PCP    Date Provider   Last Visit   3/22/2024 Arin Timmons NP   Next Visit   Visit date not found Arin Timmons NP   ED visits in past 90 days: 0        Note composed:4:08 PM 06/04/2024

## 2024-08-17 DIAGNOSIS — F51.01 PRIMARY INSOMNIA: ICD-10-CM

## 2024-08-19 RX ORDER — AMITRIPTYLINE HYDROCHLORIDE 50 MG/1
50 TABLET, FILM COATED ORAL NIGHTLY
Qty: 90 TABLET | Refills: 3 | Status: SHIPPED | OUTPATIENT
Start: 2024-08-19

## 2024-09-03 NOTE — PROGRESS NOTES
Subjective:       Patient ID: Davina Stinson is a 53 y.o. female.    Chief Complaint: 3 month follow up: , Anxiety, and Hypertension  -  The patient is a 52 y/o female presents for ANKIT follow up. At last visit paxil was inc to 20 mg with significant improvement in both ANKIT/Depression.    Still has intermittent ANKIT-situational. Was taking xanax and tolerated well yet when changed to klonopin she reports taking qhs x 1 week then developed suicidal ideations and self stopped, allergies updated.    Underwent colonoscopy, repeat in 10 yrs.   -     Past Medical History:   Diagnosis Date    Anxiety     Depression     DVT (deep venous thrombosis) 2016    Right - unknown cause    Hypertension        Past Surgical History:   Procedure Laterality Date    COLONOSCOPY N/A 2023    Procedure: COLONOSCOPY;  Surgeon: Jeanette Hooper MD;  Location: [facility];  Service: General;  Laterality: N/A;    PARTIAL HYSTERECTOMY      REDUCTION OF BOTH BREASTS          Social History     Socioeconomic History    Marital status:     Number of children: 1    Highest education level: Some college, no degree   Occupational History    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    Occupation: Works for [employer]   Tobacco Use    Smoking status: Never    Smokeless tobacco: Never   Substance and Sexual Activity    Alcohol use: Not Currently    Drug use: Not Currently    Sexual activity: Yes     Partners: Male       Family History   Problem Relation Age of Onset    Hypertension Mother     Heart disease Mother         afib    Stroke Mother     Lung cancer Mother     Diabetes Brother     Hypertension Brother     Diabetes Maternal Aunt     Diabetes Maternal Uncle     Heart disease Father          from a staff infection in the hospital    Hypertension Brother     Hypertension Brother     Diabetes Brother        Review of patient's allergies indicates:   Allergen Reactions    Klonopin  [clonazepam] Other (See Comments)      Suicidal ideations after 1 week of taking qhs    Sulfa (sulfonamide antibiotics)           Current Outpatient Medications:     amLODIPine (NORVASC) 5 MG tablet, Take 1 tablet (5 mg total) by mouth once daily., Disp: 90 tablet, Rfl: 3    gabapentin (NEURONTIN) 800 MG tablet, Take 1 tablet (800 mg total) by mouth 3 (three) times daily., Disp: 270 tablet, Rfl: 3    hydroCHLOROthiazide (HYDRODIURIL) 25 MG tablet, TAKE 1 TABLET(25 MG) BY MOUTH EVERY DAY, Disp: 90 tablet, Rfl: 3    losartan (COZAAR) 100 MG tablet, TAKE 1 TABLET(100 MG) BY MOUTH EVERY DAY, Disp: 90 tablet, Rfl: 3    paroxetine (PAXIL) 20 MG tablet, Take 1 tablet (20 mg total) by mouth every morning., Disp: 90 tablet, Rfl: 3    ALPRAZolam (XANAX) 0.25 MG tablet, Take 1 tablet (0.25 mg total) by mouth daily as needed for Anxiety., Disp: 30 tablet, Rfl: 2    amitriptyline (ELAVIL) 50 MG tablet, Take 1 tablet (50 mg total) by mouth every evening., Disp: 90 tablet, Rfl: 3    Anxiety  Symptoms include nervous/anxious behavior.       Hypertension  Associated symptoms include anxiety.     Review of Systems   Constitutional: Negative.    HENT: Negative.     Eyes: Negative.    Respiratory: Negative.     Cardiovascular: Negative.    Gastrointestinal: Negative.    Endocrine: Negative.    Genitourinary: Negative.    Musculoskeletal: Negative.    Skin: Negative.    Allergic/Immunologic: Negative.    Neurological: Negative.    Hematological: Negative.    Psychiatric/Behavioral:  Positive for dysphoric mood and sleep disturbance. The patient is nervous/anxious.        Objective:      Physical Exam  Vitals and nursing note reviewed.   Constitutional:       General: She is not in acute distress.     Appearance: Normal appearance. She is well-developed. She is obese. She is not ill-appearing.   HENT:      Head: Normocephalic.   Eyes:      Conjunctiva/sclera: Conjunctivae normal.   Neck:      Thyroid: No thyromegaly.   Cardiovascular:       Rate and Rhythm: Normal rate and regular rhythm.      Heart sounds: Normal heart sounds. No murmur heard.  Pulmonary:      Effort: Pulmonary effort is normal.      Breath sounds: Normal breath sounds. No wheezing or rales.   Musculoskeletal:         General: Normal range of motion.      Cervical back: Normal range of motion.      Right lower leg: No edema.      Left lower leg: No edema.   Skin:     General: Skin is warm and dry.   Neurological:      Mental Status: She is alert and oriented to person, place, and time. Mental status is at baseline.   Psychiatric:         Mood and Affect: Mood normal.         Behavior: Behavior normal.         Thought Content: Thought content normal.         Judgment: Judgment normal.         Assessment:       1. ANKIT (generalized anxiety disorder)    2. Primary insomnia    3. Benzodiazepine contract exists    4. Moderate episode of recurrent major depressive disorder        Plan:     1- mammo 8/24/23  2- xanax sent  3- start elavil qhs for insomnia per pt request took in the past and worked well.   4- RTC 3 mo  -    ANKIT (generalized anxiety disorder)  -     ALPRAZolam (XANAX) 0.25 MG tablet; Take 1 tablet (0.25 mg total) by mouth daily as needed for Anxiety.  Dispense: 30 tablet; Refill: 2    Primary insomnia  -     amitriptyline (ELAVIL) 50 MG tablet; Take 1 tablet (50 mg total) by mouth every evening.  Dispense: 90 tablet; Refill: 3    Benzodiazepine contract exists  -     ALPRAZolam (XANAX) 0.25 MG tablet; Take 1 tablet (0.25 mg total) by mouth daily as needed for Anxiety.  Dispense: 30 tablet; Refill: 2    Moderate episode of recurrent major depressive disorder        Risks, benefits, and side effects were discussed with the patient. All questions were answered to the fullest satisfaction of the patient, and pt verbalized understanding and agreement to treatment plan. Pt was to call with any new or worsening symptoms, or present to the ER.              Detail Level: Detailed

## 2024-09-04 ENCOUNTER — OFFICE VISIT (OUTPATIENT)
Dept: FAMILY MEDICINE | Facility: CLINIC | Age: 54
End: 2024-09-04
Payer: COMMERCIAL

## 2024-09-04 VITALS — BODY MASS INDEX: 31.86 KG/M2 | WEIGHT: 203 LBS | HEIGHT: 67 IN | OXYGEN SATURATION: 97 % | HEART RATE: 65 BPM

## 2024-09-04 DIAGNOSIS — F33.1 MODERATE EPISODE OF RECURRENT MAJOR DEPRESSIVE DISORDER: Primary | ICD-10-CM

## 2024-09-04 DIAGNOSIS — F41.1 GAD (GENERALIZED ANXIETY DISORDER): ICD-10-CM

## 2024-09-04 DIAGNOSIS — Z13.6 ENCOUNTER FOR LIPID SCREENING FOR CARDIOVASCULAR DISEASE: ICD-10-CM

## 2024-09-04 DIAGNOSIS — Z12.31 BREAST CANCER SCREENING BY MAMMOGRAM: ICD-10-CM

## 2024-09-04 DIAGNOSIS — Z13.220 ENCOUNTER FOR LIPID SCREENING FOR CARDIOVASCULAR DISEASE: ICD-10-CM

## 2024-09-04 PROCEDURE — 99214 OFFICE O/P EST MOD 30 MIN: CPT | Mod: S$GLB,,, | Performed by: NURSE PRACTITIONER

## 2024-09-04 PROCEDURE — 99999 PR PBB SHADOW E&M-EST. PATIENT-LVL III: CPT | Mod: PBBFAC,,, | Performed by: NURSE PRACTITIONER

## 2024-09-04 NOTE — PROGRESS NOTES
Subjective:       Patient ID: Davina Pradhan is a 54 y.o. female.    Chief Complaint: Loss of Consciousness (Pt states that she has been passing out x 3-4 weeks. She states this happens when she is more active. Pt is on BP med, but has also recently has lost 30 lbs.)  -  The pt is a 55 y/o female that presents for follow up. Is undergoing a lot of family stress for the last month. 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 PHQ-9 16 moderately severe.     Has lost 30 lbs since Jan and has been having dizzy spells for about 1 mo. Has not taken norvasc in months. Reports near syncopal when standing some days.     Has unresolved lumbar and cervical neck pain. [facility] with sx recommended on C-spine. Underwent PT for lumbar pain x 6 weeks for grade 1 anterolisthesis of L5 on S1 but felt therapist was not beneficial. Goes to chiropractor twice monthly. Bought inversion table hoping it will help. Dry needling discussed.   -    Past Medical History:   Diagnosis Date    Anxiety     Depression     DVT (deep venous thrombosis) 2016    Right - unknown cause    Hypertension        Past Surgical History:   Procedure Laterality Date    COLONOSCOPY N/A 05/23/2023    Procedure: COLONOSCOPY;  Surgeon: Jeanette Hooper MD;  Location: [facility];  Service: General;  Laterality: N/A;    HYSTERECTOMY      PARTIAL HYSTERECTOMY  2007    REDUCTION OF BOTH BREASTS  2006        Social History     Socioeconomic History    Marital status:     Number of children: 1    Highest education level: Some college, no degree   Occupational History    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    Occupation: Works for family owned [employer] 5/2023   Tobacco Use    Smoking status: Never    Smokeless tobacco: Never   Substance and Sexual Activity    Alcohol use: Not Currently    Drug use: Not Currently    Sexual activity: Yes     Partners:  Male       Family History   Problem Relation Name Age of Onset    Hypertension Mother      Heart disease Mother          afib    Stroke Mother      Lung cancer Mother      Heart disease Father           from a staff infection in the hospital    Diabetes Maternal Aunt      Diabetes Maternal Uncle      Breast cancer Paternal Aunt      Diabetes Brother Minh     Hypertension Brother Minh     Hypertension Brother Laci     Hypertension Brother      Diabetes Brother Kalpesh     Breast cancer Other Cousin        Review of patient's allergies indicates:   Allergen Reactions    Klonopin [clonazepam] Other (See Comments)      Suicidal ideations after 1 week of taking qhs    Sulfa (sulfonamide antibiotics)           Current Outpatient Medications:     amitriptyline (ELAVIL) 50 MG tablet, TAKE 1 TABLET(50 MG) BY MOUTH EVERY EVENING, Disp: 90 tablet, Rfl: 3    gabapentin (NEURONTIN) 800 MG tablet, Take 1 tablet (800 mg total) by mouth 2 (two) times daily., Disp: 180 tablet, Rfl: 1    losartan (COZAAR) 100 MG tablet, TAKE 1 TABLET(100 MG) BY MOUTH EVERY DAY, Disp: 90 tablet, Rfl: 3    paroxetine (PAXIL) 30 MG tablet, Take 1 tablet (30 mg total) by mouth every morning., Disp: 90 tablet, Rfl: 3    cariprazine (VRAYLAR) 1.5 mg Cap, Take 1 capsule (1.5 mg total) by mouth Daily., Disp: 30 capsule, Rfl: 11    Loss of Consciousness  Associated symptoms include back pain.     Review of Systems   Constitutional: Negative.    HENT: Negative.     Eyes: Negative.    Respiratory: Negative.     Cardiovascular:  Positive for syncope.   Gastrointestinal: Negative.    Endocrine: Negative.    Genitourinary: Negative.    Musculoskeletal:  Positive for back pain and neck pain.   Skin: Negative.    Allergic/Immunologic: Negative.    Hematological: Negative.    Psychiatric/Behavioral:  Positive for dysphoric mood and sleep disturbance. The patient is nervous/anxious.        Objective:      Physical Exam  Vitals and nursing note reviewed.    Constitutional:       General: She is not in acute distress.     Appearance: Normal appearance. She is well-developed. She is obese. She is not ill-appearing.   HENT:      Head: Normocephalic.   Eyes:      Conjunctiva/sclera: Conjunctivae normal.   Neck:      Thyroid: No thyromegaly.   Cardiovascular:      Rate and Rhythm: Normal rate and regular rhythm.      Heart sounds: Normal heart sounds. No murmur heard.  Pulmonary:      Effort: Pulmonary effort is normal.      Breath sounds: Normal breath sounds. No wheezing or rales.   Musculoskeletal:         General: Normal range of motion.      Cervical back: Normal range of motion.      Right lower leg: No edema.      Left lower leg: No edema.   Skin:     General: Skin is warm and dry.   Neurological:      Mental Status: She is alert and oriented to person, place, and time. Mental status is at baseline.   Psychiatric:         Mood and Affect: Mood normal.         Behavior: Behavior normal.         Thought Content: Thought content normal.         Judgment: Judgment normal.         Assessment:       1. Moderate episode of recurrent major depressive disorder    2. ANKIT (generalized anxiety disorder)    3. Breast cancer screening by mammogram    4. Encounter for lipid screening for cardiovascular disease        Plan:     1- will add vraylor for adjunct to paxil  2- mammo after 10/14.   3- NF labs today linked to 11/14 orders  4- stop HCTZ  5- RTC 6 weeks for med eval.  -    Moderate episode of recurrent major depressive disorder  -     cariprazine (VRAYLAR) 1.5 mg Cap; Take 1 capsule (1.5 mg total) by mouth Daily.  Dispense: 30 capsule; Refill: 11  -     TSH; Future; Expected date: 09/04/2024    ANKIT (generalized anxiety disorder)  -     cariprazine (VRAYLAR) 1.5 mg Cap; Take 1 capsule (1.5 mg total) by mouth Daily.  Dispense: 30 capsule; Refill: 11  -     TSH; Future; Expected date: 09/04/2024    Breast cancer screening by mammogram  -     Mammo Digital Screening Low w/  Efren; Future; Expected date: 10/14/2024    Encounter for lipid screening for cardiovascular disease  -     Lipid Panel; Future; Expected date: 09/04/2024        Risks, benefits, and side effects were discussed with the patient. All questions were answered to the fullest satisfaction of the patient, and pt verbalized understanding and agreement to treatment plan. Pt was to call with any new or worsening symptoms, or present to the ER.

## 2024-10-16 ENCOUNTER — PATIENT MESSAGE (OUTPATIENT)
Dept: FAMILY MEDICINE | Facility: CLINIC | Age: 54
End: 2024-10-16

## 2024-10-16 ENCOUNTER — OFFICE VISIT (OUTPATIENT)
Dept: FAMILY MEDICINE | Facility: CLINIC | Age: 54
End: 2024-10-16
Payer: COMMERCIAL

## 2024-10-16 DIAGNOSIS — G89.4 CHRONIC PAIN SYNDROME: ICD-10-CM

## 2024-10-16 DIAGNOSIS — M54.50 LUMBAR PAIN: ICD-10-CM

## 2024-10-16 DIAGNOSIS — F51.01 PRIMARY INSOMNIA: ICD-10-CM

## 2024-10-16 DIAGNOSIS — F41.1 GAD (GENERALIZED ANXIETY DISORDER): ICD-10-CM

## 2024-10-16 DIAGNOSIS — F33.1 MODERATE EPISODE OF RECURRENT MAJOR DEPRESSIVE DISORDER: Primary | ICD-10-CM

## 2024-10-16 DIAGNOSIS — M41.85 LEVOSCOLIOSIS OF THORACOLUMBAR SPINE: ICD-10-CM

## 2024-10-16 DIAGNOSIS — Z74.09 IMPAIRED FUNCTIONAL MOBILITY, BALANCE, GAIT, AND ENDURANCE: ICD-10-CM

## 2024-10-16 PROCEDURE — 99213 OFFICE O/P EST LOW 20 MIN: CPT | Mod: 95,,, | Performed by: NURSE PRACTITIONER

## 2024-10-16 RX ORDER — ARIPIPRAZOLE 5 MG/1
5 TABLET ORAL DAILY
Qty: 30 TABLET | Refills: 11 | Status: SHIPPED | OUTPATIENT
Start: 2024-10-16 | End: 2025-10-16

## 2024-10-16 RX ORDER — PAROXETINE 30 MG/1
30 TABLET, FILM COATED ORAL EVERY MORNING
Qty: 90 TABLET | Refills: 3 | Status: SHIPPED | OUTPATIENT
Start: 2024-10-16 | End: 2025-10-16

## 2024-10-16 RX ORDER — GABAPENTIN 800 MG/1
800 TABLET ORAL 2 TIMES DAILY
Qty: 180 TABLET | Refills: 1 | Status: SHIPPED | OUTPATIENT
Start: 2024-10-16

## 2024-10-16 RX ORDER — AMITRIPTYLINE HYDROCHLORIDE 50 MG/1
50 TABLET, FILM COATED ORAL NIGHTLY
Qty: 90 TABLET | Refills: 3 | Status: SHIPPED | OUTPATIENT
Start: 2024-10-16

## 2024-10-16 NOTE — PROGRESS NOTES
Subjective:       Patient ID: Davina Pradhan is a 54 y.o. female.    Chief Complaint: No chief complaint on file.  ..The patient location is: [place]  The chief complaint leading to consultation is: depression/ANKIT    Visit type: audiovisual    Face to Face time with patient: 15  minutes of total time spent on the encounter, which includes face to face time and non-face to face time preparing to see the patient (eg, review of tests), Obtaining and/or reviewing separately obtained history, Documenting clinical information in the electronic or other health record, Independently interpreting results (not separately reported) and communicating results to the patient/family/caregiver, or Care coordination (not separately reported).     Each patient to whom he or she provides medical services by telemedicine is:  (1) informed of the relationship between the physician and patient and the respective role of any other health care provider with respect to management of the patient; and (2) notified that he or she may decline to receive medical services by telemedicine and may withdraw from such care at any time.    Notes:   -  The pt is a 53 y/o female that presents with c/o worsening depression, prescribed vraylor 9/4 but reports not getting d/t cost but did not call for alternative.     Right leg shorter than left d/t scoliosis with chronic back and hip pain. Previously apply for disability but was denied and did not appeal in time. Is under the care of chiropractor which is mildly helpful. Surgery was recommended about 5 yrs ago. Recently underwent PT around March/April 2024 w/o improvement. She is interested in a surgical eval.   -    Past Medical History:   Diagnosis Date    Anxiety     Depression     DVT (deep venous thrombosis) 2016    Right - unknown cause    Hypertension        Past Surgical History:   Procedure Laterality Date    COLONOSCOPY N/A 05/23/2023    Procedure: COLONOSCOPY;  Surgeon: Jeanette Hooper MD;   Location: [facility];  Service: General;  Laterality: N/A;    HYSTERECTOMY      PARTIAL HYSTERECTOMY  2007    REDUCTION OF BOTH BREASTS          Social History     Socioeconomic History    Marital status:     Number of children: 1    Highest education level: Some college, no degree   Occupational History    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   Tobacco Use    Smoking status: Never    Smokeless tobacco: Never   Substance and Sexual Activity    Alcohol use: Not Currently    Drug use: Not Currently    Sexual activity: Yes     Partners: Male     Social Drivers of Health     Financial Resource Strain: Patient Declined (10/16/2024)    Overall Financial Resource Strain (CARDIA)     Difficulty of Paying Living Expenses: Patient declined   Food Insecurity: Patient Declined (10/16/2024)    Hunger Vital Sign     Worried About Running Out of Food in the Last Year: Patient declined     Ran Out of Food in the Last Year: Patient declined   Physical Activity: Unknown (10/16/2024)    Exercise Vital Sign     Days of Exercise per Week: Patient declined   Stress: Patient Declined (10/16/2024)    [employer] - Occupational Stress Questionnaire     Feeling of Stress : Patient declined   Housing Stability: Unknown (10/16/2024)    Housing Stability Vital Sign     Unable to Pay for Housing in the Last Year: Patient declined       Family History   Problem Relation Name Age of Onset    Hypertension Mother      Heart disease Mother          afib    Stroke Mother      Lung cancer Mother      Heart disease Father           from a staff infection in the hospital    Diabetes Maternal Aunt      Diabetes Maternal Uncle      Breast cancer Paternal Aunt      Diabetes Brother Minh     Hypertension Brother Minh     Hypertension Brother Laci     Hypertension Brother      Diabetes Brother Kalpesh     Breast cancer Other  Cousin        Review of patient's allergies indicates:   Allergen Reactions    Klonopin [clonazepam] Other (See Comments)      Suicidal ideations after 1 week of taking qhs    Sulfa (sulfonamide antibiotics)           Current Outpatient Medications:     amitriptyline (ELAVIL) 50 MG tablet, Take 1 tablet (50 mg total) by mouth every evening., Disp: 90 tablet, Rfl: 3    ARIPiprazole (ABILIFY) 5 MG Tab, Take 1 tablet (5 mg total) by mouth once daily., Disp: 30 tablet, Rfl: 11    gabapentin (NEURONTIN) 800 MG tablet, Take 1 tablet (800 mg total) by mouth 2 (two) times daily., Disp: 180 tablet, Rfl: 1    losartan (COZAAR) 100 MG tablet, TAKE 1 TABLET(100 MG) BY MOUTH EVERY DAY, Disp: 90 tablet, Rfl: 3    paroxetine (PAXIL) 30 MG tablet, Take 1 tablet (30 mg total) by mouth every morning., Disp: 90 tablet, Rfl: 3    HPI  Review of Systems   Constitutional:  Positive for activity change. Negative for unexpected weight change.   HENT: Negative.  Negative for hearing loss, rhinorrhea and trouble swallowing.    Eyes:  Positive for pain. Negative for discharge and visual disturbance.   Respiratory: Negative.  Negative for chest tightness and wheezing.    Cardiovascular: Negative.  Negative for chest pain and palpitations.   Gastrointestinal: Negative.  Negative for blood in stool, constipation, diarrhea and vomiting.   Endocrine: Negative.  Negative for polydipsia and polyuria.   Genitourinary: Negative.  Negative for difficulty urinating, dysuria, hematuria and menstrual problem.   Musculoskeletal:  Positive for back pain. Negative for arthralgias, joint swelling and neck pain.        Bilateral hip pain   Skin: Negative.    Allergic/Immunologic: Negative.    Neurological: Negative.  Negative for weakness and headaches.   Hematological: Negative.    Psychiatric/Behavioral:  Positive for dysphoric mood. Negative for confusion. The patient is nervous/anxious.        Objective:      Physical Exam  Constitutional:        Appearance: Normal appearance. She is obese. She is not ill-appearing.   HENT:      Head: Normocephalic.   Eyes:      Conjunctiva/sclera: Conjunctivae normal.   Pulmonary:      Effort: Pulmonary effort is normal.   Musculoskeletal:         General: Normal range of motion.      Cervical back: Normal range of motion.   Skin:     Coloration: Skin is not jaundiced or pale.   Neurological:      Mental Status: She is alert and oriented to person, place, and time.   Psychiatric:         Mood and Affect: Mood normal.         Behavior: Behavior normal.         Thought Content: Thought content normal.         Judgment: Judgment normal.         Assessment:       1. Moderate episode of recurrent major depressive disorder    2. Primary insomnia    3. ANKIT (generalized anxiety disorder)    4. Chronic pain syndrome    5. Impaired functional mobility, balance, gait, and endurance    6. Levoscoliosis of thoracolumbar spine    7. Lumbar pain        Plan:     1- start abilify  2- refer to Johnson Goncalves MD for spine consult  -    Moderate episode of recurrent major depressive disorder  -     ARIPiprazole (ABILIFY) 5 MG Tab; Take 1 tablet (5 mg total) by mouth once daily.  Dispense: 30 tablet; Refill: 11  -     paroxetine (PAXIL) 30 MG tablet; Take 1 tablet (30 mg total) by mouth every morning.  Dispense: 90 tablet; Refill: 3    Primary insomnia  -     amitriptyline (ELAVIL) 50 MG tablet; Take 1 tablet (50 mg total) by mouth every evening.  Dispense: 90 tablet; Refill: 3    ANKIT (generalized anxiety disorder)  -     ARIPiprazole (ABILIFY) 5 MG Tab; Take 1 tablet (5 mg total) by mouth once daily.  Dispense: 30 tablet; Refill: 11  -     paroxetine (PAXIL) 30 MG tablet; Take 1 tablet (30 mg total) by mouth every morning.  Dispense: 90 tablet; Refill: 3    Chronic pain syndrome  -     gabapentin (NEURONTIN) 800 MG tablet; Take 1 tablet (800 mg total) by mouth 2 (two) times daily.  Dispense: 180 tablet; Refill: 1    Impaired functional  mobility, balance, gait, and endurance  -     Ambulatory referral/consult to Neurosurgery; Future; Expected date: 10/23/2024    Levoscoliosis of thoracolumbar spine  -     Ambulatory referral/consult to Neurosurgery; Future; Expected date: 10/23/2024    Lumbar pain  -     Ambulatory referral/consult to Neurosurgery; Future; Expected date: 10/23/2024        Risks, benefits, and side effects were discussed with the patient. All questions were answered to the fullest satisfaction of the patient, and pt verbalized understanding and agreement to treatment plan. Pt was to call with any new or worsening symptoms, or present to the ER.

## 2024-10-20 ENCOUNTER — TELEPHONE (OUTPATIENT)
Dept: FAMILY MEDICINE | Facility: CLINIC | Age: 54
End: 2024-10-20
Payer: COMMERCIAL

## 2024-10-21 ENCOUNTER — HOSPITAL ENCOUNTER (OUTPATIENT)
Dept: RADIOLOGY | Facility: HOSPITAL | Age: 54
Discharge: HOME | End: 2024-10-21
Attending: NURSE PRACTITIONER
Payer: COMMERCIAL

## 2024-10-21 DIAGNOSIS — Z12.31 BREAST CANCER SCREENING BY MAMMOGRAM: ICD-10-CM

## 2024-10-21 PROCEDURE — 77063 BREAST TOMOSYNTHESIS BI: CPT | Mod: TC

## 2024-10-21 PROCEDURE — 77067 SCR MAMMO BI INCL CAD: CPT | Mod: 26,,, | Performed by: RADIOLOGY

## 2024-10-21 PROCEDURE — 77067 SCR MAMMO BI INCL CAD: CPT | Mod: TC

## 2024-10-21 PROCEDURE — 77063 BREAST TOMOSYNTHESIS BI: CPT | Mod: 26,,, | Performed by: RADIOLOGY

## 2024-10-22 NOTE — TELEPHONE ENCOUNTER
Spoke w/ pt. Notified pt portal message 10/16/24 by NP. Pt will review as well.   Encouraged pt to contact office if she has questions or concerns. Pt voiced understanding.

## 2024-11-15 ENCOUNTER — OFFICE VISIT (OUTPATIENT)
Dept: FAMILY MEDICINE | Facility: CLINIC | Age: 54
End: 2024-11-15
Payer: COMMERCIAL

## 2024-11-15 VITALS
HEART RATE: 71 BPM | BODY MASS INDEX: 32.77 KG/M2 | SYSTOLIC BLOOD PRESSURE: 140 MMHG | DIASTOLIC BLOOD PRESSURE: 90 MMHG | OXYGEN SATURATION: 97 % | WEIGHT: 208.81 LBS | HEIGHT: 67 IN

## 2024-11-15 DIAGNOSIS — M41.85 LEVOSCOLIOSIS OF THORACOLUMBAR SPINE: ICD-10-CM

## 2024-11-15 DIAGNOSIS — L01.00 IMPETIGO: ICD-10-CM

## 2024-11-15 DIAGNOSIS — F41.1 GAD (GENERALIZED ANXIETY DISORDER): ICD-10-CM

## 2024-11-15 DIAGNOSIS — I10 ESSENTIAL HYPERTENSION: Primary | ICD-10-CM

## 2024-11-15 PROCEDURE — 99999 PR PBB SHADOW E&M-EST. PATIENT-LVL V: CPT | Mod: PBBFAC,,, | Performed by: STUDENT IN AN ORGANIZED HEALTH CARE EDUCATION/TRAINING PROGRAM

## 2024-11-15 RX ORDER — ACETAMINOPHEN 160 MG/5ML
SUSPENSION ORAL
COMMUNITY

## 2024-11-15 RX ORDER — MUPIROCIN 20 MG/G
OINTMENT TOPICAL 3 TIMES DAILY
Qty: 1 EACH | Refills: 0 | Status: SHIPPED | OUTPATIENT
Start: 2024-11-15

## 2024-11-15 RX ORDER — AMLODIPINE AND BENAZEPRIL HYDROCHLORIDE 5; 20 MG/1; MG/1
1 CAPSULE ORAL DAILY
Qty: 90 CAPSULE | Refills: 3 | Status: SHIPPED | OUTPATIENT
Start: 2024-11-15 | End: 2025-11-15

## 2024-11-15 RX ORDER — ARIPIPRAZOLE 15 MG/1
15 TABLET ORAL DAILY
Qty: 30 TABLET | Refills: 11 | Status: SHIPPED | OUTPATIENT
Start: 2024-11-15 | End: 2025-11-15

## 2024-11-15 RX ORDER — PAROXETINE HYDROCHLORIDE 40 MG/1
40 TABLET, FILM COATED ORAL EVERY MORNING
Qty: 30 TABLET | Refills: 11 | Status: SHIPPED | OUTPATIENT
Start: 2024-11-15 | End: 2025-11-15

## 2024-11-15 NOTE — PROGRESS NOTES
SUBJECTIVE:    CHIEF COMPLAINT:   Chief Complaint   Patient presents with    Hypertension     3 weeks- dizziness, headache, tingling in extremities, left shoulder pain, and vision changes    Abrasion     Around mouth-weather changes           274}    HISTORY OF PRESENT ILLNESS:  Davina Pradhan is a 54 y.o. female who presents to the clinic today   History of Present Illness    CHIEF COMPLAINT:  Ms. Pradhan presents today for follow-up of hypertension and anxiety.    HYPERTENSION:  She was diagnosed with hypertension at age 49 and has been managing the condition for 5 years. In the last 2-3 weeks, she reports an exacerbation with symptoms including left eye pain, headaches, visual changes, and dizziness persisting despite taking Losartan. Her average blood pressure readings are 160/108 or 102. She notes a regularly low pulse around 70 or 68. She drinks 8-10 glasses (8 oz) of water daily for hydration. No other antihypertensive medications have been tried previously. Patient had dizziness w/ HCTZ. Patient used telemedicine yesterday w/ outside provider who recommended patient go to the ED d/t symptoms which patient did not go.    ANXIETY AND DEPRESSION:  She reports a lifelong history of severe anxiety with ADD. Anxiety symptoms worsen in early mornings and around 2:00 or 3:00 PM when it starts getting dark, elevating to the point of frustration and crying. Coping strategies like walks, exercise, and breathing techniques provide minimal relief. She is currently taking Abilify for anxiety, which she reports as ineffective, and Paxil, which helps with depression but not anxiety.    MUSCULOSKELETAL:  She reports constant, unrelenting back pain. An x-ray of the lower back revealed severe scoliosis. She expresses that physical therapy was not helpful and has become accustomed to the persistent pain. Patient has referral to neurosurgery but has not received a call from anyone    DERMATOLOGICAL:  She presents with a rash on  the edge of her mouth, which appeared three years ago following COVID-19. The condition flares during cool and dry weather and becomes dormant in warm, humid conditions. She experiences burning sensations when brushing teeth or performing other facial activities. A previously prescribed expensive cream was ineffective in managing symptoms. She avoids using moisturizer on the affected facial area due to concerns about potential burning sensations.    ALLERGIES:  She reports an allergy to hydrochlorothiazide.    ROS:  General: -fever, -chills, -fatigue, -weight gain, -weight loss  Eyes: -vision changes, -redness, -discharge, +eye pain  ENT: -ear pain, -nasal congestion, -sore throat  Cardiovascular: -chest pain, -palpitations, -lower extremity edema  Respiratory: -cough, -shortness of breath  Gastrointestinal: -abdominal pain, -nausea, -vomiting, -diarrhea, -constipation, -blood in stool  Genitourinary: -dysuria, -hematuria, -frequency  Musculoskeletal: -joint pain, -muscle pain, +back pain  Skin: -rash, -lesion  Neurological: +headache, +dizziness, -numbness, -tingling  Psychiatric: +anxiety, -depression, -sleep difficulty          PAST MEDICAL HISTORY:     274}  Past Medical History:   Diagnosis Date    Anxiety     Depression     DVT (deep venous thrombosis) 2016    Right - unknown cause    Hypertension        PAST SURGICAL HISTORY:  Past Surgical History:   Procedure Laterality Date    COLONOSCOPY N/A 05/23/2023    Procedure: COLONOSCOPY;  Surgeon: Jeanette Hooper MD;  Location: [facility];  Service: General;  Laterality: N/A;    HYSTERECTOMY      PARTIAL HYSTERECTOMY  2007    REDUCTION OF BOTH BREASTS  2006    TOTAL REDUCTION MAMMOPLASTY         SOCIAL HISTORY:  Social History     Socioeconomic History    Marital status:     Number of children: 1    Highest education level: Some college, no degree   Occupational History    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-[employer]   Tobacco Use    Smoking status: Never    Smokeless tobacco: Never   Substance and Sexual Activity    Alcohol use: Not Currently    Drug use: Never    Sexual activity: Not Currently     Partners: Male     Social Drivers of Health     Financial Resource Strain: Patient Declined (10/16/2024)    Overall Financial Resource Strain (CARDIA)     Difficulty of Paying Living Expenses: Patient declined   Food Insecurity: Patient Declined (10/16/2024)    Hunger Vital Sign     Worried About Running Out of Food in the Last Year: Patient declined     Ran Out of Food in the Last Year: Patient declined   Physical Activity: Unknown (10/16/2024)    Exercise Vital Sign     Days of Exercise per Week: Patient declined   Stress: Patient Declined (10/16/2024)    [employer] - Occupational Stress Questionnaire     Feeling of Stress : Patient declined   Housing Stability: Unknown (10/16/2024)    Housing Stability Vital Sign     Unable to Pay for Housing in the Last Year: Patient declined       FAMILY HISTORY:       Family History   Problem Relation Name Age of Onset    Hypertension Mother      Heart disease Mother          afib    Stroke Mother      Lung cancer Mother      Heart disease Father           from a staff infection in the hospital    Diabetes Maternal Aunt      Diabetes Maternal Uncle      Breast cancer Paternal Aunt      Diabetes Brother Minh     Hypertension Brother Minh     Hypertension Brother Laci     Hypertension Brother      Diabetes Brother Kalpesh     Breast cancer Other Cousin        ALLERGIES AND MEDICATIONS: updated and reviewed.      274}  Review of patient's allergies indicates:   Allergen Reactions    Klonopin [clonazepam] Other (See Comments)      Suicidal ideations after 1 week of taking qhs    Hydrochlorothiazide Other (See Comments)     dizziness    Sulfa (sulfonamide antibiotics)      Medication List with Changes/Refills   New  "Medications    AMLODIPINE-BENAZEPRIL 5-20 MG (LOTREL) 5-20 MG PER CAPSULE    Take 1 capsule by mouth once daily.    ARIPIPRAZOLE (ABILIFY) 15 MG TAB    Take 1 tablet (15 mg total) by mouth once daily.    MUPIROCIN (BACTROBAN) 2 % OINTMENT    Apply topically 3 (three) times daily.    PAROXETINE (PAXIL) 40 MG TABLET    Take 1 tablet (40 mg total) by mouth every morning.   Current Medications    ACETAMINOPHEN (TYLENOL) 160 MG/5 ML (5 ML) SUSP    Take by mouth.    AMITRIPTYLINE (ELAVIL) 50 MG TABLET    Take 1 tablet (50 mg total) by mouth every evening.    GABAPENTIN (NEURONTIN) 800 MG TABLET    Take 1 tablet (800 mg total) by mouth 2 (two) times daily.   Discontinued Medications    ARIPIPRAZOLE (ABILIFY) 5 MG TAB    Take 1 tablet (5 mg total) by mouth once daily.    LOSARTAN (COZAAR) 100 MG TABLET    TAKE 1 TABLET(100 MG) BY MOUTH EVERY DAY    PAROXETINE (PAXIL) 30 MG TABLET    Take 1 tablet (30 mg total) by mouth every morning.       SCREENING HISTORY:    274}  Health Maintenance         Date Due Completion Date    TETANUS VACCINE Never done ---    Shingles Vaccine (1 of 2) Never done ---    Influenza Vaccine (1) 09/01/2024 4/20/2021    COVID-19 Vaccine (1 - 2024-25 season) Never done ---    Mammogram 10/21/2025 10/21/2024    Hemoglobin A1c (Diabetic Prevention Screening) 05/16/2026 5/16/2023    Lipid Panel 05/16/2028 5/16/2023    Colorectal Cancer Screening 05/23/2033 5/23/2023    RSV Vaccine (Age 60+ and Pregnant patients) (1 - 1-dose 75+ series) 03/24/2044 ---            REVIEW OF SYSTEMS:   ROS    PHYSICAL EXAM:      274}  BP (!) 140/90 (BP Location: Left arm, Patient Position: Sitting)   Pulse 71   Ht 5' 7" (1.702 m)   Wt 94.7 kg (208 lb 12.8 oz)   LMP 04/12/2006 (Approximate)   SpO2 97%   BMI 32.70 kg/m²   Wt Readings from Last 3 Encounters:   11/15/24 94.7 kg (208 lb 12.8 oz)   09/04/24 92.1 kg (203 lb)   03/22/24 96.2 kg (212 lb)     BP Readings from Last 3 Encounters:   11/15/24 (!) 140/90   09/04/24 " "(P) 104/72   03/22/24 136/80     Estimated body mass index is 32.7 kg/m² as calculated from the following:    Height as of this encounter: 5' 7" (1.702 m).    Weight as of this encounter: 94.7 kg (208 lb 12.8 oz).     Physical Exam  Constitutional:       Appearance: Normal appearance.   HENT:      Head: Normocephalic and atraumatic.      Nose: Nose normal.      Mouth/Throat:      Mouth: Mucous membranes are dry.      Pharynx: Oropharynx is clear.   Eyes:      Extraocular Movements: Extraocular movements intact.      Conjunctiva/sclera: Conjunctivae normal.   Cardiovascular:      Rate and Rhythm: Normal rate and regular rhythm.   Pulmonary:      Effort: Pulmonary effort is normal.      Breath sounds: Normal breath sounds.   Abdominal:      General: Bowel sounds are normal.      Palpations: Abdomen is soft.   Musculoskeletal:         General: Normal range of motion.      Cervical back: Normal range of motion and neck supple.   Skin:     General: Skin is warm.   Neurological:      General: No focal deficit present.      Mental Status: She is alert. Mental status is at baseline.   Psychiatric:         Mood and Affect: Mood normal.         Thought Content: Thought content normal.         LABS:   274}  I have reviewed old labs below:  Lab Results   Component Value Date    WBC 5.14 05/16/2023    HGB 13.2 05/16/2023    HCT 39.2 05/16/2023    MCV 92 05/16/2023     05/16/2023     05/16/2023    K 4.0 05/16/2023     05/16/2023    CALCIUM 9.1 05/16/2023    CO2 26 05/16/2023     (H) 05/16/2023    BUN 16 05/16/2023    CREATININE 1.6 (H) 05/16/2023    ANIONGAP 11 05/16/2023    ESTGFRAFRICA >60.0 05/05/2021    ESTGFRAFRICA >60.0 05/05/2021    EGFRNONAA 52.1 (A) 05/05/2021    EGFRNONAA 52.1 (A) 05/05/2021    PROT 6.9 05/16/2023    ALBUMIN 3.9 05/16/2023    BILITOT 0.5 05/16/2023    ALKPHOS 71 05/16/2023    ALT 23 05/16/2023    AST 18 05/16/2023    CHOL 187 05/16/2023    TRIG 90 05/16/2023    HDL 47 " 05/16/2023    LDLCALC 122.0 05/16/2023    TSH 3.257 05/16/2023    HGBA1C 6.1 (H) 05/16/2023       ASSESSMENT AND PLAN:  274}  Assessment & Plan    IMPRESSION:  Assessed hypertension, noting persistently elevated BP despite current losartan regimen  Evaluated anxiety symptoms, considering adjustment of current psychiatric medications  Reviewed chronic back pain, acknowledging severe scoliosis on previous X-ray  Considered dermatological econsult for facial rash, potentially plaque psoriasis    HYPERTENSION:  Explained gradual blood pressure reduction approach to avoid symptoms of hypotension.  Ms. Pradhan to monitor and record blood pressure readings daily.  Ms. Pradhan to continue current water intake of 8-10 glasses per day.  Ms. Pradhan to take picture of blood pressure readings and send to office for review.  Started amlodipine/benazepril: Take 1 tablet daily for a few days, increase to 2 tablets if blood pressure remains elevated.  Send blood pressure readings to office for review.    ANXIETY AND DEPRESSION:  Discussed relationship between anxiety and depression, noting potential benefits of antidepressants for both conditions.  Increased Paxil to next higher dose.  Increased Abilify dosage (current dose is low).  Contact the office to report effectiveness of increased Abilify and Paxil doses.    LOW BACK PAIN:  MRI of lower back ordered.    SKIN CONDITION:  Referred to dermatologist for evaluation of facial rash.    FOLLOW-UP:  Follow up to recheck blood pressure.     1. Essential hypertension  - amlodipine-benazepril 5-20 mg (LOTREL) 5-20 mg per capsule; Take 1 capsule by mouth once daily.  Dispense: 90 capsule; Refill: 3    2. ANKIT (generalized anxiety disorder)  - paroxetine (PAXIL) 40 MG tablet; Take 1 tablet (40 mg total) by mouth every morning.  Dispense: 30 tablet; Refill: 11  - ARIPiprazole (ABILIFY) 15 MG Tab; Take 1 tablet (15 mg total) by mouth once daily.  Dispense: 30 tablet; Refill: 11    3.  Levoscoliosis of thoracolumbar spine  - Acupuncture; Future  - MRI Lumbar Spine Without Contrast; Future    4. Impetigo  - mupirocin (BACTROBAN) 2 % ointment; Apply topically 3 (three) times daily.  Dispense: 1 each; Refill: 0  - E-Consult to Dermatology         Orders Placed This Encounter   Procedures    Acupuncture    MRI Lumbar Spine Without Contrast       Follow up in about 4 weeks (around 12/13/2024). or sooner as needed.

## 2024-11-19 ENCOUNTER — E-CONSULT (OUTPATIENT)
Dept: DERMATOLOGY | Facility: CLINIC | Age: 54
End: 2024-11-19
Payer: COMMERCIAL

## 2024-11-19 ENCOUNTER — PATIENT MESSAGE (OUTPATIENT)
Dept: FAMILY MEDICINE | Facility: CLINIC | Age: 54
End: 2024-11-19
Payer: COMMERCIAL

## 2024-11-19 DIAGNOSIS — L30.9 LIP LICKING DERMATITIS: Primary | ICD-10-CM

## 2024-11-19 PROCEDURE — 99451 NTRPROF PH1/NTRNET/EHR 5/>: CPT | Mod: S$GLB,,, | Performed by: DERMATOLOGY

## 2024-11-19 RX ORDER — KETOCONAZOLE 20 MG/G
CREAM TOPICAL DAILY
Qty: 15 G | Refills: 0 | Status: SHIPPED | OUTPATIENT
Start: 2024-11-19

## 2024-11-19 RX ORDER — TRIAMCINOLONE ACETONIDE 0.25 MG/G
OINTMENT TOPICAL DAILY
Qty: 15 G | Refills: 0 | Status: SHIPPED | OUTPATIENT
Start: 2024-11-19

## 2024-11-19 NOTE — CONSULTS
[facility] Dermatology 4th Floor  Response for E-Consult     Patient Name: Davina Pradhan  MRN: [id number]  Primary Care Provider: Arin Timmons NP   Requesting Provider: Kimberlee Ramirez MD  E-Consult to Dermatology  Consult performed by: Mable He MD  Consult ordered by: Kimberlee Ramirez MD  Reason for consult: suspect lip licking dermatitis  Assessment/Recommendations: Irritation of the skin, given circumferential involvement of the mouth, would recommend pt avoids licking the lips as much as possible (behavioral changes).  Recommend trial of triamcinolone 0.025% ointment (mild steroid, stop use after 2 weeks) mixed with ketoconazole 2% cream twice daily for 2 weeks.  The to medication should be apply closest to the skin and then a layer barrier cream on top (to prevent breakdown of skin with lip-licking) applied at least at bedtime (Aquaphor three-in-one diaper rash ointment with zinc).  If no improvement after 2 weeks, recommend follow up with Dermatology.            Recommendation: see above    Contingency that warrants a repeat eConsult or referral: f/u with derm if fails to improve after 2 weeks    Total time of Consultation: 5 minute    I did not speak to the requesting provider verbally about this.     *This eConsult is based on the clinical data available to me and is furnished without benefit of a physical examination. The eConsult will need to be interpreted in light of any clinical issues or changes in patient status not available to me at the time of filing this eConsults. Significant changes in patient condition or level of acuity should result in immediate formal consultation and reevaluation. Please alert me if you have further questions.    Thank you for this eConsult referral.     Mable He MD  [facility] Dermatology 4th Floor

## 2024-11-22 ENCOUNTER — HOSPITAL ENCOUNTER (OUTPATIENT)
Dept: RADIOLOGY | Facility: HOSPITAL | Age: 54
Discharge: HOME | End: 2024-11-22
Attending: STUDENT IN AN ORGANIZED HEALTH CARE EDUCATION/TRAINING PROGRAM
Payer: COMMERCIAL

## 2024-11-22 DIAGNOSIS — M41.85 LEVOSCOLIOSIS OF THORACOLUMBAR SPINE: ICD-10-CM

## 2024-11-22 PROCEDURE — 72148 MRI LUMBAR SPINE W/O DYE: CPT | Mod: TC

## 2024-11-22 PROCEDURE — 72148 MRI LUMBAR SPINE W/O DYE: CPT | Mod: 26,,, | Performed by: RADIOLOGY

## 2024-12-16 ENCOUNTER — DOCUMENTATION ONLY (OUTPATIENT)
Dept: FAMILY MEDICINE | Facility: CLINIC | Age: 54
End: 2024-12-16
Payer: COMMERCIAL

## 2024-12-19 ENCOUNTER — TELEPHONE (OUTPATIENT)
Dept: FAMILY MEDICINE | Facility: CLINIC | Age: 54
End: 2024-12-19
Payer: COMMERCIAL

## 2024-12-19 NOTE — LETTER
December 19, 2024    Davina Pradhan  [address] [place] [address]             [facility]  [address]  [place] [address]  Phone: [phone]  Fax: [phone] Dear Ms. Ramirezie Pradhan:    We are sorry that you missed your appointment with Dr. Ramirez on 12/19/2024. Your health and follow-up medical care are important to us. Please call our office as soon as possible so that we may reschedule your appointment. If you have already rescheduled your appointment, please disregard this letter.    Sincerely,        Dr. Kimberlee Ramirez

## 2024-12-26 ENCOUNTER — PATIENT MESSAGE (OUTPATIENT)
Dept: ADMINISTRATIVE | Facility: HOSPITAL | Age: 54
End: 2024-12-26
Payer: COMMERCIAL

## 2025-01-15 DIAGNOSIS — I10 ESSENTIAL HYPERTENSION: ICD-10-CM

## 2025-04-07 DIAGNOSIS — G89.4 CHRONIC PAIN SYNDROME: ICD-10-CM

## 2025-04-07 RX ORDER — GABAPENTIN 800 MG/1
800 TABLET ORAL 2 TIMES DAILY
Qty: 180 TABLET | Refills: 1 | Status: SHIPPED | OUTPATIENT
Start: 2025-04-07

## 2025-04-07 NOTE — TELEPHONE ENCOUNTER
Refill Routing Note   Medication(s) are not appropriate for processing by [facility] for the following reason(s):        Outside of protocol  Non-participating provider    [facility] action(s):  Route             Appointments  past 12m or future 3m with PCP    Date Provider   Last Visit   10/16/2024 Arin Timmons NP   Next Visit   4/8/2025 Arin Timmons NP   ED visits in past 90 days: 0        Note composed:2:18 PM 04/07/2025

## 2025-04-08 ENCOUNTER — OFFICE VISIT (OUTPATIENT)
Dept: FAMILY MEDICINE | Facility: CLINIC | Age: 55
End: 2025-04-08
Payer: COMMERCIAL

## 2025-04-08 VITALS
BODY MASS INDEX: 33.69 KG/M2 | DIASTOLIC BLOOD PRESSURE: 88 MMHG | HEIGHT: 67 IN | WEIGHT: 214.63 LBS | SYSTOLIC BLOOD PRESSURE: 132 MMHG | OXYGEN SATURATION: 97 % | HEART RATE: 63 BPM

## 2025-04-08 DIAGNOSIS — E55.9 VITAMIN D DEFICIENCY: ICD-10-CM

## 2025-04-08 DIAGNOSIS — Z13.6 ENCOUNTER FOR LIPID SCREENING FOR CARDIOVASCULAR DISEASE: ICD-10-CM

## 2025-04-08 DIAGNOSIS — F41.1 GAD (GENERALIZED ANXIETY DISORDER): ICD-10-CM

## 2025-04-08 DIAGNOSIS — F51.01 PRIMARY INSOMNIA: ICD-10-CM

## 2025-04-08 DIAGNOSIS — Z13.220 ENCOUNTER FOR LIPID SCREENING FOR CARDIOVASCULAR DISEASE: ICD-10-CM

## 2025-04-08 DIAGNOSIS — F34.1 PERSISTENT DEPRESSIVE DISORDER: ICD-10-CM

## 2025-04-08 DIAGNOSIS — Z00.00 WELL ADULT EXAM: Primary | ICD-10-CM

## 2025-04-08 DIAGNOSIS — Z13.1 SCREENING FOR DIABETES MELLITUS (DM): ICD-10-CM

## 2025-04-08 PROCEDURE — 99396 PREV VISIT EST AGE 40-64: CPT | Mod: S$GLB,,, | Performed by: NURSE PRACTITIONER

## 2025-04-08 PROCEDURE — 99999 PR PBB SHADOW E&M-EST. PATIENT-LVL IV: CPT | Mod: PBBFAC,,, | Performed by: NURSE PRACTITIONER

## 2025-04-08 RX ORDER — PAROXETINE HYDROCHLORIDE 20 MG/1
20 TABLET, FILM COATED ORAL DAILY
Qty: 30 TABLET | Refills: 1 | Status: SHIPPED | OUTPATIENT
Start: 2025-04-08

## 2025-04-08 RX ORDER — DULOXETIN HYDROCHLORIDE 60 MG/1
60 CAPSULE, DELAYED RELEASE ORAL 2 TIMES DAILY
Qty: 60 CAPSULE | Refills: 2 | Status: SHIPPED | OUTPATIENT
Start: 2025-04-08

## 2025-04-08 RX ORDER — ARIPIPRAZOLE 15 MG/1
15 TABLET ORAL DAILY
Qty: 90 TABLET | Refills: 3 | Status: SHIPPED | OUTPATIENT
Start: 2025-04-08 | End: 2026-04-08

## 2025-04-08 RX ORDER — AMITRIPTYLINE HYDROCHLORIDE 50 MG/1
50 TABLET, FILM COATED ORAL NIGHTLY
Qty: 90 TABLET | Refills: 3 | Status: SHIPPED | OUTPATIENT
Start: 2025-04-08

## 2025-04-08 NOTE — PATIENT INSTRUCTIONS
decrease paxil to 20 mg and start cymbalta 60 mg same day for 2 weeks then, decrease paxil to 10 mg daily for 1 week then, take paxil 10 mg every other day for 1 week and increase cymbalta to 60 mg twice daily, then paxil 10 mg twice weekly for 1 week and stop.

## 2025-04-08 NOTE — PROGRESS NOTES
dSubjective:       Patient ID: Davina Pradhan is a 55 y.o. female.    Chief Complaint:   History of Present Illness    CHIEF COMPLAINT:  Patient presents today for wellness exam,medication refills, and disability paperwork    EMPLOYMENT STATUS:  She has been out of work since August 2023. 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    MUSCULOSKELETAL:  She has scoliosis with herniated discs in both lumbar and cervical spine. Surgery was recommended five years ago for scoliosis, which she declined.    PSYCHIATRIC:  She reports calling suicide hotline approximately one month ago seeking someone to talk to, though denies having suicidal ideation at the time. She experiences racing thoughts that prevent sleep initiation, causing her to lay awake for hours for the past couple months. She has never participated in counseling. She takes Abilify and Paxil (started 2022) but misses approximately two doses per week.       ANKIT/DEPRESSION:  Started paxil 9/2022  ANKIT-7 =  severe 19  PHQ-9 =  severe 20    CHRONIC PAIN:   Applied fro disability yesterday d/t scoliosis with chronic back and hip pain. Previously apply for disability but was denied and did not appeal in time. Is under the care of chiropractor which is mildly helpful. Surgery was recommended about 5 yrs ago.   Past Medical History:   Diagnosis Date    Anxiety     Depression     DVT (deep venous thrombosis) 2016    Right - unknown cause    Hypertension        Past Surgical History:   Procedure Laterality Date    COLONOSCOPY N/A 05/23/2023    Procedure: COLONOSCOPY;  Surgeon: Jeanette Hooper MD;  Location: [facility];  Service: General;  Laterality: N/A;    HYSTERECTOMY      PARTIAL HYSTERECTOMY  2007    REDUCTION OF BOTH BREASTS  2006    TOTAL REDUCTION MAMMOPLASTY          Social History[1]    Family History   Problem Relation Name Age of Onset     Hypertension Mother      Heart disease Mother          afib    Stroke Mother      Lung cancer Mother      Heart disease Father           from a staff infection in the hospital    Diabetes Maternal Aunt      Diabetes Maternal Uncle      Breast cancer Paternal Aunt      Diabetes Brother Minh     Hypertension Brother Minh     Hypertension Brother Laci     Hypertension Brother      Diabetes Brother Kalpesh     Breast cancer Other Cousin        Review of patient's allergies indicates:   Allergen Reactions    Klonopin [clonazepam] Other (See Comments)      Suicidal ideations after 1 week of taking qhs    Hydrochlorothiazide Other (See Comments)     dizziness    Sulfa (sulfonamide antibiotics)         Current Medications[2]    Medication Refill  Associated symptoms include arthralgias. Pertinent negatives include no rash.     Review of Systems   Constitutional: Negative.    HENT: Negative.     Eyes: Negative.    Respiratory: Negative.     Cardiovascular: Negative.    Gastrointestinal: Negative.    Endocrine: Negative.    Genitourinary: Negative.    Musculoskeletal:  Positive for arthralgias and back pain.   Skin:  Negative for rash.   Allergic/Immunologic: Negative.    Neurological: Negative.    Hematological: Negative.    Psychiatric/Behavioral:  Positive for decreased concentration, dysphoric mood and sleep disturbance. The patient is nervous/anxious.              Objective:      Physical Exam  Vitals and nursing note reviewed.   Constitutional:       General: She is not in acute distress.     Appearance: Normal appearance. She is well-developed. She is obese. She is not ill-appearing.   HENT:      Head: Normocephalic.   Eyes:      Conjunctiva/sclera: Conjunctivae normal.   Neck:      Thyroid: No thyromegaly.   Cardiovascular:      Rate and Rhythm: Normal rate and regular rhythm.      Heart sounds: Normal heart sounds. No murmur heard.  Pulmonary:      Effort: Pulmonary effort is normal.      Breath sounds:  Normal breath sounds. No wheezing or rales.   Musculoskeletal:         General: Normal range of motion.      Cervical back: Normal range of motion.      Right lower leg: No edema.      Left lower leg: No edema.   Skin:     General: Skin is warm and dry.   Neurological:      Mental Status: She is alert and oriented to person, place, and time. Mental status is at baseline.   Psychiatric:         Mood and Affect: Mood normal.         Behavior: Behavior normal.         Thought Content: Thought content normal.         Judgment: Judgment normal.         Assessment:      1. Well adult exam    2. Screening for diabetes mellitus (DM)    3. Encounter for lipid screening for cardiovascular disease    4. Vitamin D deficiency    5. Primary insomnia    6. ANKIT (generalized anxiety disorder)    7. Persistent depressive disorder        Plan:    1- Dr. Smith NSGY 4/16/25 for spine disability eval.   2- list of mental health providers given  3- decrease paxil to 20 mg and start cymbalta 60 mg same day for 2 weeks then, decrease paxil to 10 mg daily for 1 week then, take paxil 10 mg every other day for 1 week and increase cymbalta to 60 mg twice daily, then paxil 10 mg twice weekly for 1 week and stop.   4- RTC 1 mo via eigital  5- fasting labs- pt to self schedule at a [place] [facility].    Well adult exam  -     Hemoglobin A1C; Future; Expected date: 04/08/2025  -     Lipid Panel; Future; Expected date: 04/08/2025  -     CBC Auto Differential; Future; Expected date: 04/08/2025  -     Comprehensive Metabolic Panel; Future; Expected date: 04/08/2025  -     TSH; Future; Expected date: 04/08/2025  -     Vitamin D; Future; Expected date: 04/08/2025    Screening for diabetes mellitus (DM)  -     Hemoglobin A1C; Future; Expected date: 04/08/2025  -     Lipid Panel; Future; Expected date: 04/08/2025  -     CBC Auto Differential; Future; Expected date: 04/08/2025  -     Comprehensive Metabolic Panel; Future; Expected date:  04/08/2025  -     TSH; Future; Expected date: 04/08/2025  -     Vitamin D; Future; Expected date: 04/08/2025    Encounter for lipid screening for cardiovascular disease  -     Hemoglobin A1C; Future; Expected date: 04/08/2025  -     Lipid Panel; Future; Expected date: 04/08/2025  -     CBC Auto Differential; Future; Expected date: 04/08/2025  -     Comprehensive Metabolic Panel; Future; Expected date: 04/08/2025  -     TSH; Future; Expected date: 04/08/2025  -     Vitamin D; Future; Expected date: 04/08/2025    Vitamin D deficiency  -     Hemoglobin A1C; Future; Expected date: 04/08/2025  -     Lipid Panel; Future; Expected date: 04/08/2025  -     CBC Auto Differential; Future; Expected date: 04/08/2025  -     Comprehensive Metabolic Panel; Future; Expected date: 04/08/2025  -     TSH; Future; Expected date: 04/08/2025  -     Vitamin D; Future; Expected date: 04/08/2025    Primary insomnia  -     amitriptyline (ELAVIL) 50 MG tablet; Take 1 tablet (50 mg total) by mouth every evening.  Dispense: 90 tablet; Refill: 3    ANKIT (generalized anxiety disorder)  -     paroxetine (PAXIL) 20 MG tablet; Take 1 tablet (20 mg total) by mouth once daily.  Dispense: 30 tablet; Refill: 1  -     DULoxetine (CYMBALTA) 60 MG capsule; Take 1 capsule (60 mg total) by mouth 2 (two) times daily.  Dispense: 60 capsule; Refill: 2  -     ARIPiprazole (ABILIFY) 15 MG Tab; Take 1 tablet (15 mg total) by mouth once daily.  Dispense: 90 tablet; Refill: 3    Persistent depressive disorder  -     paroxetine (PAXIL) 20 MG tablet; Take 1 tablet (20 mg total) by mouth once daily.  Dispense: 30 tablet; Refill: 1  -     DULoxetine (CYMBALTA) 60 MG capsule; Take 1 capsule (60 mg total) by mouth 2 (two) times daily.  Dispense: 60 capsule; Refill: 2  -     ARIPiprazole (ABILIFY) 15 MG Tab; Take 1 tablet (15 mg total) by mouth once daily.  Dispense: 90 tablet; Refill: 3        Risks, benefits, and side effects were discussed with the patient. All questions  were answered to the fullest satisfaction of the patient, and pt verbalized understanding and agreement to treatment plan. Pt was to call with any new or worsening symptoms, or present to the ER.        This note was generated with the assistance of ambient listening technology. Verbal consent was obtained by the patient and accompanying visitor(s) for the recording of patient appointment to facilitate this note. I attest to having reviewed and edited the generated note for accuracy, though some syntax or spelling errors may persist. Please contact the author of this note for any clarification.                 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    Occupation: Works for family owned [employer] 5/2023   Tobacco Use    Smoking status: Never    Smokeless tobacco: Never   Substance and Sexual Activity    Alcohol use: Not Currently    Drug use: Never    Sexual activity: Not Currently     Partners: Male     Social Drivers of Health     Financial Resource Strain: Patient Declined (10/16/2024)    Overall Financial Resource Strain (CARDIA)     Difficulty of Paying Living Expenses: Patient declined   Food Insecurity: Patient Declined (10/16/2024)    Hunger Vital Sign     Worried About Running Out of Food in the Last Year: Patient declined     Ran Out of Food in the Last Year: Patient declined   Physical Activity: Unknown (10/16/2024)    Exercise Vital Sign     Days of Exercise per Week: Patient declined   Stress: Patient Declined (10/16/2024)    [place] [facility] [employer] - Occupational Stress Questionnaire     Feeling of Stress : Patient declined   Housing Stability: Unknown (10/16/2024)    Housing Stability Vital Sign     Unable to Pay for Housing in the Last Year: Patient declined   [2]   Current Outpatient  Medications:     amitriptyline (ELAVIL) 50 MG tablet, Take 1 tablet (50 mg total) by mouth every evening., Disp: 90 tablet, Rfl: 3    amlodipine-benazepril 5-20 mg (LOTREL) 5-20 mg per capsule, Take 1 capsule by mouth once daily., Disp: 90 capsule, Rfl: 3    ARIPiprazole (ABILIFY) 15 MG Tab, Take 1 tablet (15 mg total) by mouth once daily., Disp: 90 tablet, Rfl: 3    DULoxetine (CYMBALTA) 60 MG capsule, Take 1 capsule (60 mg total) by mouth 2 (two) times daily., Disp: 60 capsule, Rfl: 2    gabapentin (NEURONTIN) 800 MG tablet, TAKE 1 TABLET(800 MG) BY MOUTH TWICE DAILY, Disp: 180 tablet, Rfl: 1    paroxetine (PAXIL) 20 MG tablet, Take 1 tablet (20 mg total) by mouth once daily., Disp: 30 tablet, Rfl: 1

## 2025-04-16 NOTE — PROGRESS NOTES
I have reviewed the notes, assessments, and/or procedures performed by Solange Timmons NP, I concur with her documentation of Davina Pradhan.    Nayely Myers MD

## 2025-05-06 ENCOUNTER — OFFICE VISIT (OUTPATIENT)
Dept: FAMILY MEDICINE | Facility: CLINIC | Age: 55
End: 2025-05-06
Payer: COMMERCIAL

## 2025-05-06 DIAGNOSIS — F34.1 PERSISTENT DEPRESSIVE DISORDER: ICD-10-CM

## 2025-05-06 DIAGNOSIS — F41.1 GAD (GENERALIZED ANXIETY DISORDER): Primary | ICD-10-CM

## 2025-05-06 PROCEDURE — 99499 UNLISTED E&M SERVICE: CPT | Mod: 95,,, | Performed by: NURSE PRACTITIONER

## 2025-05-06 NOTE — PROGRESS NOTES
dSubjective:       Patient ID: Davina Pradhan is a 55 y.o. female.    Chief Complaint:  0 minutes of total time spent on the encounter, which includes face to face time and non-face to face time preparing to see the patient (eg, review of tests), Obtaining and/or reviewing separately obtained history, Documenting clinical information in the electronic or other health record, Independently interpreting results (not separately reported) and communicating results to the patient/family/caregiver, or Care coordination (not separately reported).     Each patient to whom he or she provides medical services by telemedicine is:  (1) informed of the relationship between the physician and patient and the respective role of any other health care provider with respect to management of the patient; and (2) notified that he or she may decline to receive medical services by telemedicine and may withdraw from such care at any time.     Answers submitted by the patient for this visit:  Review of Systems Questionnaire (Submitted on 5/6/2025)  activity change: No  unexpected weight change: No  neck pain: No  hearing loss: No  rhinorrhea: No  trouble swallowing: No  eye discharge: No  visual disturbance: No  chest tightness: No  wheezing: No  chest pain: No  palpitations: No  blood in stool: No  constipation: No  vomiting: No  diarrhea: No  polydipsia: No  polyuria: No  difficulty urinating: No  hematuria: No  menstrual problem: No  dysuria: No  joint swelling: No  arthralgias: Yes  headaches: No  weakness: No  confusion: No  dysphoric mood: No     History of Present Illness      Pt was not seen nor talked to related to complaint reported technical difficulties when I called thus enc to reschedule after PW reset.       Past Medical History:   Diagnosis Date    Anxiety     Depression     DVT (deep venous thrombosis) 2016    Right - unknown cause    Hypertension        Past Surgical History:   Procedure Laterality Date    COLONOSCOPY N/A  2023    Procedure: COLONOSCOPY;  Surgeon: Jeanette Hooper MD;  Location: [facility];  Service: General;  Laterality: N/A;    HYSTERECTOMY      PARTIAL HYSTERECTOMY      REDUCTION OF BOTH BREASTS      TOTAL REDUCTION MAMMOPLASTY          Social History[1]    Family History   Problem Relation Name Age of Onset    Hypertension Mother      Heart disease Mother          afib    Stroke Mother      Lung cancer Mother      Heart disease Father           from a staff infection in the hospital    Diabetes Maternal Aunt      Diabetes Maternal Uncle      Breast cancer Paternal Aunt      Diabetes Brother Minh     Hypertension Brother Minh     Hypertension Brother Laci     Hypertension Brother      Diabetes Brother Kalpesh     Breast cancer Other Cousin        Review of patient's allergies indicates:   Allergen Reactions    Klonopin [clonazepam] Other (See Comments)      Suicidal ideations after 1 week of taking qhs    Hydrochlorothiazide Other (See Comments)     dizziness    Sulfa (sulfonamide antibiotics)         Current Medications[2]    HPI  Review of Systems          Objective:      Physical Exam    Assessment:      1. ANKIT (generalized anxiety disorder)    2. Persistent depressive disorder        Plan:          ANKIT (generalized anxiety disorder)    Persistent depressive disorder        Risks, benefits, and side effects were discussed with the patient. All questions were answered to the fullest satisfaction of the patient, and pt verbalized understanding and agreement to treatment plan. Pt was to call with any new or worsening symptoms, or present to the ER.        This note was generated with the assistance of ambient listening technology. Verbal consent was obtained by the patient and accompanying visitor(s) for the recording of patient appointment to facilitate this note. I attest to having reviewed and edited the generated note for accuracy, though some syntax or spelling errors may persist. Please  contact the author of this note for any clarification.                   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: Works for family owned [employer] 5/2023   Tobacco Use    Smoking status: Never    Smokeless tobacco: Never   Substance and Sexual Activity    Alcohol use: Not Currently    Drug use: Never    Sexual activity: Not Currently     Partners: Male     Social Drivers of Health     Financial Resource Strain: Medium Risk (5/6/2025)    Overall Financial Resource Strain (CARDIA)     Difficulty of Paying Living Expenses: Somewhat hard   Food Insecurity: Patient Declined (5/6/2025)    Hunger Vital Sign     Worried About Running Out of Food in the Last Year: Patient declined     Ran Out of Food in the Last Year: Patient declined   Transportation Needs: Patient Declined (5/6/2025)    PRAPARE - Transportation     Lack of Transportation (Medical): Patient declined     Lack of Transportation (Non-Medical): Patient declined   Physical Activity: Unknown (5/6/2025)    Exercise Vital Sign     Days of Exercise per Week: Patient declined     Minutes of Exercise per Session: 10 min   Stress: Stress Concern Present (5/6/2025)    [place] [employer] - Occupational Stress Questionnaire     Feeling of Stress : Very much   Housing Stability: Patient Declined (5/6/2025)    Housing Stability Vital Sign     Unable to Pay for Housing in the Last Year: Patient declined     Homeless in the Last Year: Patient declined   [2]   Current Outpatient Medications:     amitriptyline (ELAVIL) 50 MG tablet, Take 1 tablet (50 mg total) by mouth every evening., Disp: 90 tablet, Rfl: 3    amlodipine-benazepril 5-20 mg (LOTREL) 5-20 mg per capsule, Take 1 capsule by mouth once daily., Disp: 90 capsule, Rfl: 3     ARIPiprazole (ABILIFY) 15 MG Tab, Take 1 tablet (15 mg total) by mouth once daily., Disp: 90 tablet, Rfl: 3    DULoxetine (CYMBALTA) 60 MG capsule, Take 1 capsule (60 mg total) by mouth 2 (two) times daily., Disp: 60 capsule, Rfl: 2    gabapentin (NEURONTIN) 800 MG tablet, TAKE 1 TABLET(800 MG) BY MOUTH TWICE DAILY, Disp: 180 tablet, Rfl: 1    paroxetine (PAXIL) 20 MG tablet, Take 1 tablet (20 mg total) by mouth once daily., Disp: 30 tablet, Rfl: 1

## 2025-05-11 DIAGNOSIS — F41.1 GAD (GENERALIZED ANXIETY DISORDER): ICD-10-CM

## 2025-05-11 DIAGNOSIS — F34.1 PERSISTENT DEPRESSIVE DISORDER: ICD-10-CM

## 2025-05-12 RX ORDER — PAROXETINE 20 MG/1
TABLET, FILM COATED ORAL
Qty: 30 TABLET | Refills: 1 | OUTPATIENT
Start: 2025-05-12

## 2025-06-09 DIAGNOSIS — F41.1 GAD (GENERALIZED ANXIETY DISORDER): ICD-10-CM

## 2025-06-09 DIAGNOSIS — F34.1 PERSISTENT DEPRESSIVE DISORDER: ICD-10-CM

## 2025-06-09 NOTE — TELEPHONE ENCOUNTER
Refill Routing Note   Medication(s) are not appropriate for processing by [facility] for the following reason(s):        Non-participating provider    [facility] action(s):  Route             Appointments  past 12m or future 3m with PCP    Date Provider   Last Visit   5/6/2025 Arin Timmons NP   Next Visit   Visit date not found Arin Timmons NP   ED visits in past 90 days: 0        Note composed:12:21 PM 06/09/2025

## 2025-06-10 RX ORDER — DULOXETIN HYDROCHLORIDE 60 MG/1
CAPSULE, DELAYED RELEASE ORAL
Qty: 180 CAPSULE | Refills: 3 | Status: SHIPPED | OUTPATIENT
Start: 2025-06-10

## (undated) DEVICE — KIT CANIST SUCTION 1200CC

## (undated) DEVICE — SOL IRRI STRL WATER 1000ML

## (undated) DEVICE — KIT ENDO CARRY-ON PROC 100310

## (undated) DEVICE — SOL WATER STRL IRR 1000ML

## (undated) DEVICE — GLOVE PI ULTRA TOUCH G SURGEON